# Patient Record
Sex: MALE | Race: WHITE | URBAN - METROPOLITAN AREA
[De-identification: names, ages, dates, MRNs, and addresses within clinical notes are randomized per-mention and may not be internally consistent; named-entity substitution may affect disease eponyms.]

---

## 2019-07-15 ENCOUNTER — TELEPHONE (OUTPATIENT)
Dept: CARDIOLOGY CLINIC | Age: 84
End: 2019-07-15

## 2019-07-15 NOTE — TELEPHONE ENCOUNTER
LMOM to contact office if still interested in scheduling appointment with Dr. Zafar Mcdowell in [place] office.

## 2021-10-13 PROBLEM — D64.9 ANEMIA: Status: ACTIVE | Noted: 2020-03-04

## 2021-10-13 PROBLEM — N30.01 ACUTE HEMORRHAGIC CYSTITIS: Status: ACTIVE | Noted: 2021-07-17

## 2021-10-13 PROBLEM — N17.9 AKI (ACUTE KIDNEY INJURY) (HCC): Status: ACTIVE | Noted: 2020-02-19

## 2021-10-13 PROBLEM — I10 ACCELERATED HYPERTENSION: Status: ACTIVE | Noted: 2020-05-07

## 2021-10-13 PROBLEM — I71.21 ASCENDING AORTIC ANEURYSM: Status: ACTIVE | Noted: 2020-06-05

## 2022-03-18 PROBLEM — I71.21 ASCENDING AORTIC ANEURYSM (HCC): Status: ACTIVE | Noted: 2020-06-05

## 2022-03-19 PROBLEM — N30.01 ACUTE HEMORRHAGIC CYSTITIS: Status: ACTIVE | Noted: 2021-07-17

## 2022-03-19 PROBLEM — D64.9 ANEMIA: Status: ACTIVE | Noted: 2020-03-04

## 2022-03-20 PROBLEM — I10 ACCELERATED HYPERTENSION: Status: ACTIVE | Noted: 2020-05-07

## 2022-03-20 PROBLEM — N17.9 AKI (ACUTE KIDNEY INJURY) (HCC): Status: ACTIVE | Noted: 2020-02-19

## 2023-07-14 ENCOUNTER — NURSE ONLY (OUTPATIENT)
Dept: LAB | Age: 88
End: 2023-07-14

## 2023-07-14 LAB
ALBUMIN SERPL BCG-MCNC: 3.3 G/DL (ref 3.5–5.1)
ALP SERPL-CCNC: 58 U/L (ref 38–126)
ALT SERPL W/O P-5'-P-CCNC: 13 U/L (ref 11–66)
ANION GAP SERPL CALC-SCNC: 11 MEQ/L (ref 8–16)
AST SERPL-CCNC: 17 U/L (ref 5–40)
BASOPHILS ABSOLUTE: 0 THOU/MM3 (ref 0–0.1)
BASOPHILS NFR BLD AUTO: 0.6 %
BILIRUB SERPL-MCNC: 0.3 MG/DL (ref 0.3–1.2)
BUN SERPL-MCNC: 44 MG/DL (ref 7–22)
CALCIUM SERPL-MCNC: 9.1 MG/DL (ref 8.5–10.5)
CHLORIDE SERPL-SCNC: 107 MEQ/L (ref 98–111)
CHOLEST SERPL-MCNC: 105 MG/DL (ref 100–199)
CO2 SERPL-SCNC: 26 MEQ/L (ref 23–33)
CREAT SERPL-MCNC: 2 MG/DL (ref 0.4–1.2)
DEPRECATED RDW RBC AUTO: 57.7 FL (ref 35–45)
EOSINOPHIL NFR BLD AUTO: 4.8 %
EOSINOPHILS ABSOLUTE: 0.3 THOU/MM3 (ref 0–0.4)
ERYTHROCYTE [DISTWIDTH] IN BLOOD BY AUTOMATED COUNT: 16.5 % (ref 11.5–14.5)
FERRITIN SERPL IA-MCNC: 210 NG/ML (ref 22–322)
GFR SERPL CREATININE-BSD FRML MDRD: 32 ML/MIN/1.73M2
GLUCOSE SERPL-MCNC: 84 MG/DL (ref 70–108)
HCT VFR BLD AUTO: 29.7 % (ref 42–52)
HDLC SERPL-MCNC: 45 MG/DL
HGB BLD-MCNC: 9.1 GM/DL (ref 14–18)
IMM GRANULOCYTES # BLD AUTO: 0.02 THOU/MM3 (ref 0–0.07)
IMM GRANULOCYTES NFR BLD AUTO: 0.3 %
LDLC SERPL CALC-MCNC: 52 MG/DL
LYMPHOCYTES ABSOLUTE: 1.1 THOU/MM3 (ref 1–4.8)
LYMPHOCYTES NFR BLD AUTO: 17.5 %
MAGNESIUM SERPL-MCNC: 2.1 MG/DL (ref 1.6–2.4)
MCH RBC QN AUTO: 29.4 PG (ref 26–33)
MCHC RBC AUTO-ENTMCNC: 30.6 GM/DL (ref 32.2–35.5)
MCV RBC AUTO: 96.1 FL (ref 80–94)
MONOCYTES ABSOLUTE: 0.6 THOU/MM3 (ref 0.4–1.3)
MONOCYTES NFR BLD AUTO: 9.1 %
NEUTROPHILS NFR BLD AUTO: 67.7 %
NRBC BLD AUTO-RTO: 0 /100 WBC
PLATELET # BLD AUTO: 276 THOU/MM3 (ref 130–400)
PMV BLD AUTO: 10.9 FL (ref 9.4–12.4)
POTASSIUM SERPL-SCNC: 4.3 MEQ/L (ref 3.5–5.2)
PROT SERPL-MCNC: 6.2 G/DL (ref 6.1–8)
RBC # BLD AUTO: 3.09 MILL/MM3 (ref 4.7–6.1)
SEGMENTED NEUTROPHILS ABSOLUTE COUNT: 4.4 THOU/MM3 (ref 1.8–7.7)
SODIUM SERPL-SCNC: 144 MEQ/L (ref 135–145)
T4 FREE SERPL-MCNC: 1.9 NG/DL (ref 0.93–1.76)
TRIGL SERPL-MCNC: 39 MG/DL (ref 0–199)
TSH SERPL DL<=0.005 MIU/L-ACNC: 0.34 UIU/ML (ref 0.4–4.2)
VIT B12 SERPL-MCNC: 769 PG/ML (ref 211–911)
WBC # BLD AUTO: 6.5 THOU/MM3 (ref 4.8–10.8)

## 2023-07-17 ENCOUNTER — OFFICE VISIT (OUTPATIENT)
Dept: NEPHROLOGY | Age: 88
End: 2023-07-17
Payer: MEDICARE

## 2023-07-17 VITALS
BODY MASS INDEX: 21.61 KG/M2 | WEIGHT: 138 LBS | DIASTOLIC BLOOD PRESSURE: 66 MMHG | SYSTOLIC BLOOD PRESSURE: 122 MMHG | OXYGEN SATURATION: 98 % | HEART RATE: 81 BPM

## 2023-07-17 DIAGNOSIS — D63.1 ANEMIA IN STAGE 3B CHRONIC KIDNEY DISEASE (HCC): ICD-10-CM

## 2023-07-17 DIAGNOSIS — N18.32 ANEMIA IN STAGE 3B CHRONIC KIDNEY DISEASE (HCC): ICD-10-CM

## 2023-07-17 DIAGNOSIS — I12.9 HYPERTENSIVE RENAL DISEASE, STAGE 1 THROUGH STAGE 4 OR UNSPECIFIED CHRONIC KIDNEY DISEASE: ICD-10-CM

## 2023-07-17 DIAGNOSIS — N18.32 CHRONIC KIDNEY DISEASE, STAGE 3B (HCC): Primary | ICD-10-CM

## 2023-07-17 PROCEDURE — G8420 CALC BMI NORM PARAMETERS: HCPCS | Performed by: INTERNAL MEDICINE

## 2023-07-17 PROCEDURE — G8427 DOCREV CUR MEDS BY ELIG CLIN: HCPCS | Performed by: INTERNAL MEDICINE

## 2023-07-17 PROCEDURE — 1036F TOBACCO NON-USER: CPT | Performed by: INTERNAL MEDICINE

## 2023-07-17 PROCEDURE — 1123F ACP DISCUSS/DSCN MKR DOCD: CPT | Performed by: INTERNAL MEDICINE

## 2023-07-17 PROCEDURE — 99204 OFFICE O/P NEW MOD 45 MIN: CPT | Performed by: INTERNAL MEDICINE

## 2023-07-17 RX ORDER — MONTELUKAST SODIUM 10 MG/1
10 TABLET ORAL DAILY
COMMUNITY
Start: 2023-04-27

## 2023-07-17 RX ORDER — CLONIDINE 0.3 MG/24H
PATCH, EXTENDED RELEASE TRANSDERMAL
COMMUNITY
Start: 2023-06-15

## 2023-07-17 RX ORDER — MIRTAZAPINE 7.5 MG/1
7.5 TABLET, FILM COATED ORAL NIGHTLY
COMMUNITY
Start: 2023-06-15

## 2023-07-17 RX ORDER — ESCITALOPRAM OXALATE 10 MG/1
10 TABLET ORAL DAILY
COMMUNITY
Start: 2023-07-10

## 2023-07-17 RX ORDER — ALBUTEROL SULFATE 90 UG/1
AEROSOL, METERED RESPIRATORY (INHALATION)
COMMUNITY
Start: 2023-06-14

## 2023-07-17 NOTE — PROGRESS NOTES
[facility] KIDNEY AND HYPERTENSION  [address] [facility]  Dept: [phone]  Loc: [phone]  Outpatient Consult Note  7/17/2023 2:32 PM        Pt Name:    Whit Martinez  YOB: 1935  Primary Care Physician:  Prabhjot Hoffman     Chief Complaint:   Chief Complaint   Patient presents with    New Patient     Dr. Cici Quach abnormal labs        Background Information/HPI   The patient is a 81 y.o. white malepatient who was sent in for evaluation of elevated creatinine. Pt reports hx CKD. Was previously seeing a nephrologist in [place]. Says he moved here about in Feb 2023. Reports hx HTN. Reports hx valve replacement at [place]. Denies any hx stents. No hx PPM or AICD. Ex-smoking. Reports hx colon cancer s/p colon resection long time ago. No hx kidney stones. No hx leg swelling. No urinary complaints such as dysuria or hematuria. Has mild ankle edema. No previous NSAId use. Previous  work. Reports long standing hx HTN. Says BP was running high in the past. He is on hydralazine and clonidine patch. Also takes lasix. Says Bp was 388 systolic at PCP office.  Reports BP has been an issues at times in the past.       Allergies:  Morphine and Iodinated contrast media        Past Medical History:  Past Medical History:   Diagnosis Date    Acute hemorrhagic cystitis     JACKI (acute kidney injury) ([address])     Aortic stenosis     Cataracts, bilateral     Chronic diastolic heart failure (HCC)     CKD (chronic kidney disease)     Esophageal reflux     High cholesterol     History of gross hematuria     History of tobacco use     History of urinary retention     Hypertension     Hypertrophy of prostate without urinary obstruction and other lower urinary tract symptoms (LUTS)     Hypothyroid     Incomplete bladder emptying     Nocturia     4-5x a night    PAF (paroxysmal atrial fibrillation) (HCC)     PVD (peripheral vascular

## 2023-07-19 DIAGNOSIS — N18.32 CHRONIC KIDNEY DISEASE, STAGE 3B (HCC): ICD-10-CM

## 2023-07-19 DIAGNOSIS — I12.9 HYPERTENSIVE RENAL DISEASE, STAGE 1 THROUGH STAGE 4 OR UNSPECIFIED CHRONIC KIDNEY DISEASE: ICD-10-CM

## 2023-08-21 ENCOUNTER — NURSE ONLY (OUTPATIENT)
Age: 88
End: 2023-08-21

## 2023-08-21 LAB
T4 FREE SERPL-MCNC: 1.63 NG/DL (ref 0.93–1.76)
TSH SERPL DL<=0.005 MIU/L-ACNC: 0.17 UIU/ML (ref 0.4–4.2)

## 2023-09-12 LAB
BILIRUBIN URINE: NEGATIVE
BLOOD, URINE: NEGATIVE
BUN BLDV-MCNC: 55 MG/DL (ref 7–22)
CALCIUM SERPL-MCNC: 9 MG/DL (ref 8.4–10.2)
CHLORIDE BLD-SCNC: 106 MEQ/L (ref 98–107)
CLARITY: CLEAR
CO2: 29 MEQ/L (ref 22–31)
COLOR, URINE: YELLOW
CREAT SERPL-MCNC: 2.8 MG/DL (ref 0.4–1.1)
CREATININE URINE: 102.9 MG/DL
GFR SERPL CREATININE-BSD FRML MDRD: 22 ML/MIN/{1.73_M2}
GLUCOSE URINE: NEGATIVE MG/DL
GLUCOSE: 86 MG/DL (ref 70–126)
HCT VFR BLD CALC: 29 % (ref 42–52)
HEMOGLOBIN: 9.7 G/DL (ref 14–18)
KETONES, URINE: NEGATIVE MG/DL
LEUKOCYTES, UA: NEGATIVE
MICROSCOPIC URINE: NO
NITRITE, URINE: NEGATIVE
PH, URINE: 5 (ref 5–8)
PHOSPHORUS: 3.9 MG/DL (ref 2.5–4.6)
POTASSIUM SERPL-SCNC: 3.5 MEQ/L (ref 3.5–5.1)
PROTEIN, URINE: 16 MG/DL (ref 0–10)
PROTEIN, URINE: NEGATIVE MG/DL
SODIUM BLD-SCNC: 142 MEQ/L (ref 136–145)
SPECIFIC GRAVITY UA: 1.01 (ref 1.01–1.02)
UROBILINOGEN, URINE: 0.2 E.U./DL (ref 0.2–1)
VITAMIN D 25-HYDROXY: 40 NG/ML (ref 30–100)

## 2023-09-13 LAB — PARATHYROID HORMONE INTACT: 59.7 PG/ML (ref 15–65)

## 2023-09-18 ENCOUNTER — OFFICE VISIT (OUTPATIENT)
Dept: NEPHROLOGY | Age: 88
End: 2023-09-18
Payer: MEDICARE

## 2023-09-18 VITALS
DIASTOLIC BLOOD PRESSURE: 67 MMHG | WEIGHT: 139 LBS | HEART RATE: 82 BPM | OXYGEN SATURATION: 98 % | SYSTOLIC BLOOD PRESSURE: 118 MMHG | BODY MASS INDEX: 21.77 KG/M2

## 2023-09-18 DIAGNOSIS — I12.9 HYPERTENSIVE RENAL DISEASE, STAGE 1 THROUGH STAGE 4 OR UNSPECIFIED CHRONIC KIDNEY DISEASE: ICD-10-CM

## 2023-09-18 DIAGNOSIS — D63.1 ANEMIA IN STAGE 4 CHRONIC KIDNEY DISEASE (HCC): ICD-10-CM

## 2023-09-18 DIAGNOSIS — N18.4 CKD (CHRONIC KIDNEY DISEASE), STAGE IV (HCC): Primary | ICD-10-CM

## 2023-09-18 DIAGNOSIS — N18.4 ANEMIA IN STAGE 4 CHRONIC KIDNEY DISEASE (HCC): ICD-10-CM

## 2023-09-18 PROCEDURE — 1036F TOBACCO NON-USER: CPT | Performed by: INTERNAL MEDICINE

## 2023-09-18 PROCEDURE — 1123F ACP DISCUSS/DSCN MKR DOCD: CPT | Performed by: INTERNAL MEDICINE

## 2023-09-18 PROCEDURE — G8420 CALC BMI NORM PARAMETERS: HCPCS | Performed by: INTERNAL MEDICINE

## 2023-09-18 PROCEDURE — G8427 DOCREV CUR MEDS BY ELIG CLIN: HCPCS | Performed by: INTERNAL MEDICINE

## 2023-09-18 PROCEDURE — 99213 OFFICE O/P EST LOW 20 MIN: CPT | Performed by: INTERNAL MEDICINE

## 2023-09-18 RX ORDER — ISOSORBIDE MONONITRATE 60 MG/1
60 TABLET, EXTENDED RELEASE ORAL DAILY
COMMUNITY
Start: 2023-09-04

## 2023-09-18 NOTE — PROGRESS NOTES
[facility] KIDNEY AND HYPERTENSION  [address] [address]  Dept: [phone]  Loc: [phone]  Office Progress Note  9/18/2023 2:53 PM      Pt Name:    Morales Barton  YOB: 1935  Primary Care Physician:  Cameron Grider     Chief Complaint:   Chief Complaint   Patient presents with    Chronic Kidney Disease     Stage 4        Background Information/Interval History:   The patient is a 81 y.o. white malepatient who was sent in for evaluation of elevated creatinine. Pt reports hx CKD. Was previously seeing a nephrologist in [place]. Says he moved here about in Feb 2023. Reports hx HTN. Reports hx valve replacement at [place]. Ex-smoking. Reports hx colon cancer s/p colon resection long time ago. Reports long standing hx HTN. Says BP was running high in the past. He is on hydralazine and clonidine patch. Also takes lasix. He is here for 2 months follow-up. Here with his daughter in law. Feels okay. No urinary complaints. BP is stable in office today. Pt does not remember the name of his nephrologist that he was seeing in [place].       Past History:  Past Medical History:   Diagnosis Date    Acute hemorrhagic cystitis     JACKI (acute kidney injury) ([address])     Aortic stenosis     Cataracts, bilateral     Chronic diastolic heart failure (HCC)     CKD (chronic kidney disease)     Esophageal reflux     High cholesterol     History of gross hematuria     History of tobacco use     History of urinary retention     Hypertension     Hypertrophy of prostate without urinary obstruction and other lower urinary tract symptoms (LUTS)     Hypothyroid     Incomplete bladder emptying     Nocturia     4-5x a night    PAF (paroxysmal atrial fibrillation) (HCC)     PVD (peripheral vascular disease) (HCC)     Reflux     Sleep apnea     Urinary tract infection, site not specified      Past Surgical History:   Procedure Laterality Date

## 2023-10-13 ENCOUNTER — NURSE ONLY (OUTPATIENT)
Age: 88
End: 2023-10-13

## 2023-10-13 LAB
ALBUMIN SERPL BCG-MCNC: 3.9 G/DL (ref 3.5–5.1)
ALP SERPL-CCNC: 80 U/L (ref 38–126)
ALT SERPL W/O P-5'-P-CCNC: 13 U/L (ref 11–66)
ANION GAP SERPL CALC-SCNC: 11 MEQ/L (ref 8–16)
AST SERPL-CCNC: 19 U/L (ref 5–40)
BASOPHILS ABSOLUTE: 0.1 THOU/MM3 (ref 0–0.1)
BASOPHILS NFR BLD AUTO: 0.9 %
BILIRUB SERPL-MCNC: 0.3 MG/DL (ref 0.3–1.2)
BUN SERPL-MCNC: 42 MG/DL (ref 7–22)
CALCIUM SERPL-MCNC: 9.4 MG/DL (ref 8.5–10.5)
CHLORIDE SERPL-SCNC: 105 MEQ/L (ref 98–111)
CHOLEST SERPL-MCNC: 105 MG/DL (ref 100–199)
CO2 SERPL-SCNC: 29 MEQ/L (ref 23–33)
CREAT SERPL-MCNC: 2.7 MG/DL (ref 0.4–1.2)
DEPRECATED RDW RBC AUTO: 53.2 FL (ref 35–45)
EOSINOPHIL NFR BLD AUTO: 3.7 %
EOSINOPHILS ABSOLUTE: 0.3 THOU/MM3 (ref 0–0.4)
ERYTHROCYTE [DISTWIDTH] IN BLOOD BY AUTOMATED COUNT: 15.3 % (ref 11.5–14.5)
FERRITIN SERPL IA-MCNC: 225 NG/ML (ref 22–322)
GFR SERPL CREATININE-BSD FRML MDRD: 22 ML/MIN/1.73M2
GLUCOSE SERPL-MCNC: 71 MG/DL (ref 70–108)
HCT VFR BLD AUTO: 32.2 % (ref 42–52)
HDLC SERPL-MCNC: 43 MG/DL
HGB BLD-MCNC: 9.8 GM/DL (ref 14–18)
IMM GRANULOCYTES # BLD AUTO: 0.02 THOU/MM3 (ref 0–0.07)
IMM GRANULOCYTES NFR BLD AUTO: 0.3 %
LDLC SERPL CALC-MCNC: 54 MG/DL
LYMPHOCYTES ABSOLUTE: 1.1 THOU/MM3 (ref 1–4.8)
LYMPHOCYTES NFR BLD AUTO: 15.9 %
MAGNESIUM SERPL-MCNC: 2.4 MG/DL (ref 1.6–2.4)
MCH RBC QN AUTO: 29.4 PG (ref 26–33)
MCHC RBC AUTO-ENTMCNC: 30.4 GM/DL (ref 32.2–35.5)
MCV RBC AUTO: 96.7 FL (ref 80–94)
MONOCYTES ABSOLUTE: 0.7 THOU/MM3 (ref 0.4–1.3)
MONOCYTES NFR BLD AUTO: 9.9 %
NEUTROPHILS NFR BLD AUTO: 69.3 %
NRBC BLD AUTO-RTO: 0 /100 WBC
PLATELET # BLD AUTO: 279 THOU/MM3 (ref 130–400)
PMV BLD AUTO: 10.7 FL (ref 9.4–12.4)
POTASSIUM SERPL-SCNC: 4.6 MEQ/L (ref 3.5–5.2)
PROT SERPL-MCNC: 6.6 G/DL (ref 6.1–8)
PSA SERPL-MCNC: 0.32 NG/ML (ref 0–1)
RBC # BLD AUTO: 3.33 MILL/MM3 (ref 4.7–6.1)
SEGMENTED NEUTROPHILS ABSOLUTE COUNT: 4.9 THOU/MM3 (ref 1.8–7.7)
SODIUM SERPL-SCNC: 145 MEQ/L (ref 135–145)
T4 FREE SERPL-MCNC: 2.05 NG/DL (ref 0.93–1.76)
TRIGL SERPL-MCNC: 39 MG/DL (ref 0–199)
TSH SERPL DL<=0.005 MIU/L-ACNC: 0.75 UIU/ML (ref 0.4–4.2)
VIT B12 SERPL-MCNC: 676 PG/ML (ref 211–911)
WBC # BLD AUTO: 7 THOU/MM3 (ref 4.8–10.8)

## 2023-10-18 ENCOUNTER — NURSE ONLY (OUTPATIENT)
Age: 88
End: 2023-10-18

## 2023-10-18 DIAGNOSIS — D63.1 ANEMIA IN STAGE 4 CHRONIC KIDNEY DISEASE (HCC): ICD-10-CM

## 2023-10-18 DIAGNOSIS — N18.4 ANEMIA IN STAGE 4 CHRONIC KIDNEY DISEASE (HCC): ICD-10-CM

## 2023-10-18 DIAGNOSIS — N18.4 CKD (CHRONIC KIDNEY DISEASE), STAGE IV (HCC): ICD-10-CM

## 2023-10-18 DIAGNOSIS — I12.9 HYPERTENSIVE RENAL DISEASE, STAGE 1 THROUGH STAGE 4 OR UNSPECIFIED CHRONIC KIDNEY DISEASE: ICD-10-CM

## 2023-10-18 LAB
HCT VFR BLD AUTO: 32.7 % (ref 42–52)
HGB BLD-MCNC: 9.9 GM/DL (ref 14–18)

## 2023-10-19 LAB
ANION GAP SERPL CALC-SCNC: 11 MEQ/L (ref 8–16)
BUN SERPL-MCNC: 49 MG/DL (ref 7–22)
CALCIUM SERPL-MCNC: 9 MG/DL (ref 8.5–10.5)
CHLORIDE SERPL-SCNC: 105 MEQ/L (ref 98–111)
CO2 SERPL-SCNC: 28 MEQ/L (ref 23–33)
CREAT SERPL-MCNC: 2.4 MG/DL (ref 0.4–1.2)
FERRITIN SERPL IA-MCNC: 199 NG/ML (ref 22–322)
FOLATE SERPL-MCNC: 15.2 NG/ML (ref 4.8–24.2)
GFR SERPL CREATININE-BSD FRML MDRD: 25 ML/MIN/1.73M2
GLUCOSE SERPL-MCNC: 137 MG/DL (ref 70–108)
IRON SATN MFR SERPL: 31 % (ref 20–50)
IRON SERPL-MCNC: 60 UG/DL (ref 65–195)
POTASSIUM SERPL-SCNC: 4.4 MEQ/L (ref 3.5–5.2)
SODIUM SERPL-SCNC: 144 MEQ/L (ref 135–145)
TIBC SERPL-MCNC: 191 UG/DL (ref 171–450)
VIT B12 SERPL-MCNC: 594 PG/ML (ref 211–911)

## 2023-12-04 ENCOUNTER — NURSE ONLY (OUTPATIENT)
Age: 88
End: 2023-12-04

## 2023-12-04 LAB
T4 FREE SERPL-MCNC: 1.81 NG/DL (ref 0.93–1.76)
TSH SERPL DL<=0.005 MIU/L-ACNC: 0.34 UIU/ML (ref 0.4–4.2)

## 2023-12-07 ENCOUNTER — OFFICE VISIT (OUTPATIENT)
Dept: NEPHROLOGY | Age: 88
End: 2023-12-07
Payer: MEDICARE

## 2023-12-07 VITALS
HEART RATE: 73 BPM | OXYGEN SATURATION: 97 % | WEIGHT: 141 LBS | HEIGHT: 67 IN | SYSTOLIC BLOOD PRESSURE: 118 MMHG | BODY MASS INDEX: 22.13 KG/M2 | DIASTOLIC BLOOD PRESSURE: 59 MMHG

## 2023-12-07 DIAGNOSIS — R35.0 FREQUENCY OF MICTURITION: ICD-10-CM

## 2023-12-07 DIAGNOSIS — N28.1 RENAL CYST: ICD-10-CM

## 2023-12-07 DIAGNOSIS — N18.4 CKD (CHRONIC KIDNEY DISEASE), STAGE IV (HCC): Primary | ICD-10-CM

## 2023-12-07 DIAGNOSIS — R33.9 INCOMPLETE BLADDER EMPTYING: ICD-10-CM

## 2023-12-07 DIAGNOSIS — I12.9 HYPERTENSIVE RENAL DISEASE, STAGE 1 THROUGH STAGE 4 OR UNSPECIFIED CHRONIC KIDNEY DISEASE: ICD-10-CM

## 2023-12-07 PROCEDURE — G8427 DOCREV CUR MEDS BY ELIG CLIN: HCPCS | Performed by: INTERNAL MEDICINE

## 2023-12-07 PROCEDURE — G8484 FLU IMMUNIZE NO ADMIN: HCPCS | Performed by: INTERNAL MEDICINE

## 2023-12-07 PROCEDURE — G8420 CALC BMI NORM PARAMETERS: HCPCS | Performed by: INTERNAL MEDICINE

## 2023-12-07 PROCEDURE — 1036F TOBACCO NON-USER: CPT | Performed by: INTERNAL MEDICINE

## 2023-12-07 PROCEDURE — 1123F ACP DISCUSS/DSCN MKR DOCD: CPT | Performed by: INTERNAL MEDICINE

## 2023-12-07 PROCEDURE — 99213 OFFICE O/P EST LOW 20 MIN: CPT | Performed by: INTERNAL MEDICINE

## 2023-12-07 NOTE — PROGRESS NOTES
[facility] KIDNEY AND HYPERTENSION  [address] [address]  Dept: [phone]  Loc: [phone]  Office Progress Note  12/7/2023 1:57 PM      Pt Name:    Юлия Wyatt  YOB: 1944  Primary Care Physician:  Román Russell     Chief Complaint:   Chief Complaint   Patient presents with    Chronic Kidney Disease     Stage 4        Background Information/Interval History:   The patient is a 81 y.o. white malepatient who was sent in for evaluation of elevated creatinine. Pt reports hx CKD. Was previously seeing a nephrologist in [place]. Says he moved here about in Feb 2023. Reports hx HTN. Reports hx valve replacement at [place]. Ex-smoking. Reports hx colon cancer s/p colon resection long time ago. Reports long standing hx HTN. Says BP was running high in the past. He is on hydralazine and clonidine patch. Also takes lasix. Here with his daughter in law. No hematuria. Reports incomplete bladder emptying, frequent dribbling. No leg swelling.       Past History:  Past Medical History:   Diagnosis Date    Acute hemorrhagic cystitis     JACKI (acute kidney injury) ([address])     Aortic stenosis     Cataracts, bilateral     Chronic diastolic heart failure (HCC)     CKD (chronic kidney disease)     Esophageal reflux     High cholesterol     History of gross hematuria     History of tobacco use     History of urinary retention     Hypertension     Hypertrophy of prostate without urinary obstruction and other lower urinary tract symptoms (LUTS)     Hypothyroid     Incomplete bladder emptying     Nocturia     4-5x a night    PAF (paroxysmal atrial fibrillation) (HCC)     PVD (peripheral vascular disease) (HCC)     Reflux     Sleep apnea     Urinary tract infection, site not specified      Past Surgical History:   Procedure Laterality Date    AORTIC VALVE REPLACEMENT      CARDIAC CATHETERIZATION      CATARACT REMOVAL

## 2024-01-22 ENCOUNTER — NURSE ONLY (OUTPATIENT)
Age: 89
End: 2024-01-22

## 2024-01-22 LAB
T4 FREE SERPL-MCNC: 1.67 NG/DL (ref 0.93–1.76)
TSH SERPL DL<=0.005 MIU/L-ACNC: 1.9 UIU/ML (ref 0.4–4.2)

## 2024-01-23 ENCOUNTER — HOSPITAL ENCOUNTER (OUTPATIENT)
Age: 89
Discharge: HOME | End: 2024-01-23
Payer: MEDICARE

## 2024-01-23 ENCOUNTER — TELEPHONE (OUTPATIENT)
Dept: UROLOGY | Age: 89
End: 2024-01-23

## 2024-01-23 ENCOUNTER — PROCEDURE VISIT (OUTPATIENT)
Dept: UROLOGY | Age: 89
End: 2024-01-23
Payer: MEDICARE

## 2024-01-23 VITALS — RESPIRATION RATE: 16 BRPM | WEIGHT: 141 LBS | BODY MASS INDEX: 22.13 KG/M2 | HEIGHT: 67 IN

## 2024-01-23 DIAGNOSIS — N40.1 BENIGN LOCALIZED PROSTATIC HYPERPLASIA WITH LOWER URINARY TRACT SYMPTOMS (LUTS): ICD-10-CM

## 2024-01-23 DIAGNOSIS — N40.1 BENIGN LOCALIZED PROSTATIC HYPERPLASIA WITH LOWER URINARY TRACT SYMPTOMS (LUTS): Primary | ICD-10-CM

## 2024-01-23 DIAGNOSIS — Z01.818 PRE-OP TESTING: ICD-10-CM

## 2024-01-23 LAB
ANION GAP SERPL CALC-SCNC: 9 MEQ/L (ref 8–16)
BASOPHILS ABSOLUTE: 0 THOU/MM3 (ref 0–0.1)
BASOPHILS NFR BLD AUTO: 0.6 %
BUN SERPL-MCNC: 54 MG/DL (ref 7–22)
CALCIUM SERPL-MCNC: 9.3 MG/DL (ref 8.5–10.5)
CHLORIDE SERPL-SCNC: 102 MEQ/L (ref 98–111)
CO2 SERPL-SCNC: 31 MEQ/L (ref 23–33)
CREAT SERPL-MCNC: 2.6 MG/DL (ref 0.4–1.2)
DEPRECATED RDW RBC AUTO: 51.6 FL (ref 35–45)
EOSINOPHIL NFR BLD AUTO: 3 %
EOSINOPHILS ABSOLUTE: 0.2 THOU/MM3 (ref 0–0.4)
ERYTHROCYTE [DISTWIDTH] IN BLOOD BY AUTOMATED COUNT: 14.3 % (ref 11.5–14.5)
GFR SERPL CREATININE-BSD FRML MDRD: 23 ML/MIN/1.73M2
GLUCOSE SERPL-MCNC: 92 MG/DL (ref 70–108)
HCT VFR BLD AUTO: 31.4 % (ref 42–52)
HGB BLD-MCNC: 9.9 GM/DL (ref 14–18)
IMM GRANULOCYTES # BLD AUTO: 0.02 THOU/MM3 (ref 0–0.07)
IMM GRANULOCYTES NFR BLD AUTO: 0.3 %
LYMPHOCYTES ABSOLUTE: 1.1 THOU/MM3 (ref 1–4.8)
LYMPHOCYTES NFR BLD AUTO: 16.7 %
MCH RBC QN AUTO: 30.7 PG (ref 26–33)
MCHC RBC AUTO-ENTMCNC: 31.5 GM/DL (ref 32.2–35.5)
MCV RBC AUTO: 97.5 FL (ref 80–94)
MONOCYTES ABSOLUTE: 0.6 THOU/MM3 (ref 0.4–1.3)
MONOCYTES NFR BLD AUTO: 9.3 %
NEUTROPHILS NFR BLD AUTO: 70.1 %
NRBC BLD AUTO-RTO: 0 /100 WBC
PLATELET # BLD AUTO: 229 THOU/MM3 (ref 130–400)
PMV BLD AUTO: 10.5 FL (ref 9.4–12.4)
POTASSIUM SERPL-SCNC: 3.8 MEQ/L (ref 3.5–5.2)
RBC # BLD AUTO: 3.22 MILL/MM3 (ref 4.7–6.1)
SEGMENTED NEUTROPHILS ABSOLUTE COUNT: 4.6 THOU/MM3 (ref 1.8–7.7)
SODIUM SERPL-SCNC: 142 MEQ/L (ref 135–145)
WBC # BLD AUTO: 6.6 THOU/MM3 (ref 4.8–10.8)

## 2024-01-23 PROCEDURE — 85025 COMPLETE CBC W/AUTO DIFF WBC: CPT

## 2024-01-23 PROCEDURE — 36415 COLL VENOUS BLD VENIPUNCTURE: CPT

## 2024-01-23 PROCEDURE — 52000 CYSTOURETHROSCOPY: CPT | Performed by: UROLOGY

## 2024-01-23 PROCEDURE — 93005 ELECTROCARDIOGRAM TRACING: CPT

## 2024-01-23 PROCEDURE — 87077 CULTURE AEROBIC IDENTIFY: CPT

## 2024-01-23 PROCEDURE — 87186 SC STD MICRODIL/AGAR DIL: CPT

## 2024-01-23 PROCEDURE — 80048 BASIC METABOLIC PNL TOTAL CA: CPT

## 2024-01-23 PROCEDURE — 87086 URINE CULTURE/COLONY COUNT: CPT

## 2024-01-23 NOTE — TELEPHONE ENCOUNTER
DO NOT TAKE  FISH OIL, MOBIC, IBUPROFEN, MOTRIN-LIKE DRUGS AND ANY MULTIVITAMINS OR OVER THE COUNTER SUPPLEMENTS 14 DAYS PRIOR TO SURGERY.    HOLD ASPIRIN 5 DAYS PRIOR TO SURGERY  HOLD LASIX THE MORNING OF SURGERY    MUST HAVE AN ADULT OVER THE AGE OF 18 WITH YOU AT THE TIME OF THE DISCHARGE AND WITH YOU AT HOME AFTER THE PROCEDURE FOR 24 HOURS        Samuel Gupta 1935     Surgical Physician: Dr. Hester      You have been scheduled for the procedure marked below:      Surgery: Cystoscopy, Greenlight Photo Vaporization of Prostate         Date: 2/6/2024     Anesthesia: Anesthesiologist (General/Spinal)     Place of Service: [facility] Second Floor Same Day Surgery         Arrive to same day surgery at:  9:00am  (Surgery time is subject to change)      INSTRUCTIONS AS MARKED BELOW:    1.  DO NOT eat or drink anything after midnight before surgery.  2.  We prefer you shower or bathe with an antibacterial soap (Dial) the morning of surgery.  3  Please bring a current medication list, photo ID and insurance card(s) with you  4. Okay to take Tylenol  5. Take blood pressure or heart medication as directed, if taken in the morning take with a small sip of water  6.The office will call you in 1-2 days after your procedure to schedule a follow up.            Date: 1/23/2024

## 2024-01-23 NOTE — TELEPHONE ENCOUNTER
CARDIAC CLEARANCE FORM    IF THE PATIENT NEEDS A CLEARANCE APPOINTMENT WITH YOUR OFFICE PLEASE CONTACT THE PATIENT    Clearance From  Dr. Barragan     Appointment Date    Time       Samuel Gupta  1935  Surgeon:  Dr. Jimbo Hester    Procedure:  Cystoscopy, Greenlight Photo Vaporization of Prostate        Date:  2/6/2024  Facility: [facility]    I.  MEDICAL HISTORY  DM CAD PVD CVA DVT/PE MI CHFMalignant Hyperthemia HTN Tobacco/ETOH Sleep Apnea GERD Hyperlipidemia Renal Insufficiency COPD/Asthma Bleeding Disorder Pacemaker/AICD  II. CURRENT MEDICATIONS: Attach list or complete     Pt is on following meds that need special instructions for surgery:  Anticoagulants Heart Meds ASA Insulin Oral anti-diabetics NSAIDS Diuretics     K replacements  III.  ALLERGIES:   IV.  FUNCTIONAL CAPACITY  >4 METS (CAN VACUUM/HOUSEWORK,CLIMB FLIGHT STAIRS WITHOUT DYSPNEA)  <4METS (FLIGHT OF STEPS CAUSES DYSPNEA/CARDIAC SYMPTOMS)   Stress Test Recommended:    Stress tests or Cardiac Cath in last 5 years:  Yes (attach report)  No   Results: WNL   ABN  Any Change in Cardiac symptoms: Yes  NO  Comments:    Revascularization in last 5 years: Yes  NO  CABG: Yes  No   Comments:     Stents:  Date: ________  Any change in cardiac symptoms  Yes  NO  Comments:   V.  REVIEW OF SYSTEMS:  (Pertinent positive or negative)    VI. PHYSICIAL EXAM  HEENT:1.  Dentations   Good Poor          HT:_______ WT:______      2.  Neck Pathology: Rheumatoid DDD C-spine  BP:______ P:________  PULMONARY:  CARDIAC  ABDOMEN  EXTREMITIES  OTHER  VII.  Testing Ordered by Surgeon  Reviewed by Clearance Physician  Test      Result  Plan,if Abnormal  ___CBS     WNL  ABN____________________  ___BMP/BUN/CR    WNL  ABN____________________  ___K+      WNL  ABN____________________  ___UA      WNL  ABN____________________  ___CXR     WNL  ABN____________________  ___EKG     WNL  ABN____________________  ___MRSA     WNL  ABN____________________  VIII.  ___Acceptable risk for

## 2024-01-23 NOTE — TELEPHONE ENCOUNTER
SURGERY SCHEDULING FORM   [address] [address]      Phone *[phone] *[phone]   Surgical Scheduling Direct Line Phone *[phone] Fax *[phone]      Samuel Gupta 1935 male    [address] [place] [address] [place] [address]  Marital Status:          Home Phone: [phone]      Cell Phone:    Telephone Information:   Mobile [phone]          Surgeon: Dr. Hester Surgery Date: 2/6/2024   Time: 11:00am    Procedure: Cystoscopy, Greenlight Photo Vaporization of Prostate    Diagnosis: BPH     Important Medical History:  In Epic    Special Inst/Equip: UMER Choi conf#[id number]    CPT Codes:    78[address]  Latex Allergy: No     Cardiac Device:  No    Anesthesia:  General          Admission Type:  Same Day                        Admit Prior to Day of Surgery: No    Case Location:  Main OR            Preadmission Testing:  Phone Call          PAT Date and Time:______________________________________________________    PAT Confirmation #: ______________________________________________________    Post Op Visit: ___________________________________________________________    Need Preop Cardiac Clearance: Yes    Does Patient have Cardiologist/physician?     Dr. Barragan    Surgery Confirmation #: __________________________________________________    : ________________________   Date: __________________________     Insurance Company Name: [employer]

## 2024-01-23 NOTE — TELEPHONE ENCOUNTER
Patient is scheduled for surgery with  on 2/6/2024. Surgery consent to be done on arrival. Dr. EPSTEIN to clear. Patient to do pre op URINE CULTURE, LABS AND EKG UPON LEAVING THE OFFICE TODAY, 1/23/2024. Surgery instructions gone over with patient verbally in the office   Patient informed an adult over the age of 18 must be with them at the time of surgery, upon discharge and at home for 24 hours after the procedure.    THI CONF#[id number] PER REMIGIO

## 2024-01-23 NOTE — PROGRESS NOTES
Cystoscopy    Operative Note    Patient:  Samuel Gupta  MRN: [id number]  YOB: 1935    Date: 01/23/24  Surgeon: CORDELL FREEMAN MD  Anesthesia: Urojet Local  Indications:     BPH- worsening. Hx of outlet surgery in last ten years. On avodart and flomax. Terrible bother. Needs cystoscopy for varma eval.         Position: Supine  EBL: 0 ml    Findings:   The patient was prepped and draped in the usual sterile fashion.  The flexible cystoscope was advanced through the urethra and into the bladder.  The bladder was thoroughly inspected and the following was noted:    Residual Urine: moderate.  Urine clear, with no obvious infection  Urethra: No abnormalities of the urethra are noted. Urethral dilation was not performed.    Prostate:  open bladder neck but regrowth of apical tissue , intravesical extension of prostate not present. There was a previous TURP defect.   Bladder: no tumor noted .   Moderate trabeculation noted.  no bladder diverticulum.  Ureters: Orifices with normal configuration and location.      The cystoscope was removed.  The patient tolerated the procedure well.  Cysto pvp (25)

## 2024-01-24 LAB
EKG ATRIAL RATE: 74 BPM
EKG P AXIS: 71 DEGREES
EKG P-R INTERVAL: 164 MS
EKG Q-T INTERVAL: 462 MS
EKG QRS DURATION: 158 MS
EKG QTC CALCULATION (BAZETT): 512 MS
EKG R AXIS: -64 DEGREES
EKG T AXIS: 72 DEGREES
EKG VENTRICULAR RATE: 74 BPM

## 2024-01-25 ENCOUNTER — PREP FOR PROCEDURE (OUTPATIENT)
Dept: UROLOGY | Age: 89
End: 2024-01-25

## 2024-01-25 LAB
BACTERIA UR CULT: ABNORMAL
ORGANISM: ABNORMAL

## 2024-01-26 RX ORDER — LEVOFLOXACIN 500 MG/1
500 TABLET, FILM COATED ORAL DAILY
Qty: 7 TABLET | Refills: 0 | Status: SHIPPED | OUTPATIENT
Start: 2024-01-26 | End: 2024-02-02

## 2024-01-27 RX ORDER — SODIUM CHLORIDE 0.9 % (FLUSH) 0.9 %
5-40 SYRINGE (ML) INJECTION EVERY 12 HOURS SCHEDULED
Status: CANCELLED | OUTPATIENT
Start: 2024-01-27

## 2024-01-27 RX ORDER — IPRATROPIUM BROMIDE AND ALBUTEROL SULFATE 2.5; .5 MG/3ML; MG/3ML
1 SOLUTION RESPIRATORY (INHALATION) EVERY 4 HOURS PRN
Status: CANCELLED | OUTPATIENT
Start: 2024-02-06

## 2024-01-27 RX ORDER — SODIUM CHLORIDE 0.9 % (FLUSH) 0.9 %
5-40 SYRINGE (ML) INJECTION PRN
Status: CANCELLED | OUTPATIENT
Start: 2024-01-27

## 2024-01-27 RX ORDER — SODIUM CHLORIDE 9 MG/ML
INJECTION, SOLUTION INTRAVENOUS PRN
Status: CANCELLED | OUTPATIENT
Start: 2024-01-27

## 2024-01-29 RX ORDER — FUROSEMIDE 40 MG/1
40 TABLET ORAL DAILY
COMMUNITY

## 2024-01-29 RX ORDER — LEVOTHYROXINE SODIUM 0.1 MG/1
100 TABLET ORAL DAILY
COMMUNITY

## 2024-01-29 NOTE — PROGRESS NOTES
In preparation for their surgical procedure above patient was screened for Obstructive Sleep Apnea (KATIA) using the STOP-Bang Questionnaire by the Pre-Admission Testing department.  This is a pre-surgical screening tool for patient safety and serves as a recommendation, this WILL NOT cause cancellation of surgery.    STOP-Bang Questionnaire  * Do you currently see a pulmonologist?  No     If yes STOP, do not complete.  Patient follows with Dr.     1.  Do you snore loudly (able to be heard in the next room)?      No    2.  Do you often feel tired or sleepy during the daytime?          No       3.  Has anyone ever told you that you stop breathing during your sleep?       No    4.  Do you have or are you being treated for high blood pressure?          Yes      5.  BMI more than 35?  BMI (Calculated): 22        No    6.  Age over 50 years? 88 y.o.      Yes    7.  Neck Circumference greater than 17 inches for male or 16 inches for female?  Measured           (visits only)            Not Applicable    8.  Gender Male?                 Yes      TOTAL SCORE: 3    KATIA - Low Risk : Yes to 0 - 2 questions  KATIA - Intermediate Risk : Yes to 3 - 4 questions  KATIA - High Risk : Yes to 5 - 8 questions    Adapted from:   STOP Questionnaire: A Tool to Screen Patients for Obstructive Sleep Apnea   CORAZON Jimenez.SUZIE.C.P.C., Mk Sharp M.B.B.S., Sabas Quintero M.D., Zoya Basurto, Ph.D., CECY Diaz.B.B.S., CECY Bailey.Sc., John Childers M.D., Joseph Meléndez F.R.C.P.C.   Anesthesiology 2008; 108:812-21 Copyright 2008, the American Society of Anesthesiologists, Inc. [employer]   ----------------------------------------------------------------------------------------------------------------

## 2024-02-06 ENCOUNTER — ANESTHESIA EVENT (OUTPATIENT)
Dept: OPERATING ROOM | Age: 89
End: 2024-02-06
Payer: MEDICARE

## 2024-02-06 ENCOUNTER — HOSPITAL ENCOUNTER (OUTPATIENT)
Age: 89
Setting detail: OUTPATIENT SURGERY
Discharge: HOME | End: 2024-02-06
Attending: UROLOGY | Admitting: UROLOGY
Payer: MEDICARE

## 2024-02-06 ENCOUNTER — ANESTHESIA (OUTPATIENT)
Dept: OPERATING ROOM | Age: 89
End: 2024-02-06
Payer: MEDICARE

## 2024-02-06 VITALS
WEIGHT: 141.6 LBS | TEMPERATURE: 97.6 F | DIASTOLIC BLOOD PRESSURE: 65 MMHG | SYSTOLIC BLOOD PRESSURE: 155 MMHG | RESPIRATION RATE: 16 BRPM | HEART RATE: 80 BPM | BODY MASS INDEX: 22.76 KG/M2 | OXYGEN SATURATION: 98 % | HEIGHT: 66 IN

## 2024-02-06 DIAGNOSIS — G89.18 POSTOPERATIVE PAIN: Primary | ICD-10-CM

## 2024-02-06 LAB — POTASSIUM SERPL-SCNC: 4.1 MEQ/L (ref 3.5–5.2)

## 2024-02-06 PROCEDURE — 3600000013 HC SURGERY LEVEL 3 ADDTL 15MIN: Performed by: UROLOGY

## 2024-02-06 PROCEDURE — 7100000001 HC PACU RECOVERY - ADDTL 15 MIN: Performed by: UROLOGY

## 2024-02-06 PROCEDURE — 3700000000 HC ANESTHESIA ATTENDED CARE: Performed by: UROLOGY

## 2024-02-06 PROCEDURE — 7100000010 HC PHASE II RECOVERY - FIRST 15 MIN: Performed by: UROLOGY

## 2024-02-06 PROCEDURE — 2720000010 HC SURG SUPPLY STERILE: Performed by: UROLOGY

## 2024-02-06 PROCEDURE — 36415 COLL VENOUS BLD VENIPUNCTURE: CPT

## 2024-02-06 PROCEDURE — 3600000003 HC SURGERY LEVEL 3 BASE: Performed by: UROLOGY

## 2024-02-06 PROCEDURE — 7100000000 HC PACU RECOVERY - FIRST 15 MIN: Performed by: UROLOGY

## 2024-02-06 PROCEDURE — 2580000003 HC RX 258: Performed by: UROLOGY

## 2024-02-06 PROCEDURE — 84132 ASSAY OF SERUM POTASSIUM: CPT

## 2024-02-06 PROCEDURE — 6360000002 HC RX W HCPCS: Performed by: NURSE ANESTHETIST, CERTIFIED REGISTERED

## 2024-02-06 PROCEDURE — 7100000011 HC PHASE II RECOVERY - ADDTL 15 MIN: Performed by: UROLOGY

## 2024-02-06 PROCEDURE — 6360000002 HC RX W HCPCS: Performed by: UROLOGY

## 2024-02-06 PROCEDURE — 3700000001 HC ADD 15 MINUTES (ANESTHESIA): Performed by: UROLOGY

## 2024-02-06 PROCEDURE — 2500000003 HC RX 250 WO HCPCS: Performed by: NURSE ANESTHETIST, CERTIFIED REGISTERED

## 2024-02-06 PROCEDURE — 2709999900 HC NON-CHARGEABLE SUPPLY: Performed by: UROLOGY

## 2024-02-06 RX ORDER — PHENAZOPYRIDINE HYDROCHLORIDE 200 MG/1
200 TABLET, FILM COATED ORAL 3 TIMES DAILY PRN
Qty: 9 TABLET | Refills: 1 | Status: SHIPPED | OUTPATIENT
Start: 2024-02-06 | End: 2024-02-09

## 2024-02-06 RX ORDER — HYDROCODONE BITARTRATE AND ACETAMINOPHEN 5; 325 MG/1; MG/1
1 TABLET ORAL EVERY 6 HOURS PRN
Qty: 9 TABLET | Refills: 0 | Status: SHIPPED | OUTPATIENT
Start: 2024-02-06 | End: 2024-02-11

## 2024-02-06 RX ORDER — SODIUM CHLORIDE 9 MG/ML
INJECTION, SOLUTION INTRAVENOUS PRN
Status: DISCONTINUED | OUTPATIENT
Start: 2024-02-06 | End: 2024-02-06 | Stop reason: HOSPADM

## 2024-02-06 RX ORDER — IPRATROPIUM BROMIDE AND ALBUTEROL SULFATE 2.5; .5 MG/3ML; MG/3ML
1 SOLUTION RESPIRATORY (INHALATION) EVERY 4 HOURS PRN
Status: DISCONTINUED | OUTPATIENT
Start: 2024-02-06 | End: 2024-02-06 | Stop reason: HOSPADM

## 2024-02-06 RX ORDER — SODIUM CHLORIDE 0.9 % (FLUSH) 0.9 %
5-40 SYRINGE (ML) INJECTION EVERY 12 HOURS SCHEDULED
Status: DISCONTINUED | OUTPATIENT
Start: 2024-02-06 | End: 2024-02-06 | Stop reason: HOSPADM

## 2024-02-06 RX ORDER — HYDROMORPHONE HYDROCHLORIDE 2 MG/ML
INJECTION, SOLUTION INTRAMUSCULAR; INTRAVENOUS; SUBCUTANEOUS PRN
Status: DISCONTINUED | OUTPATIENT
Start: 2024-02-06 | End: 2024-02-06 | Stop reason: SDUPTHER

## 2024-02-06 RX ORDER — SODIUM CHLORIDE 0.9 % (FLUSH) 0.9 %
5-40 SYRINGE (ML) INJECTION PRN
Status: DISCONTINUED | OUTPATIENT
Start: 2024-02-06 | End: 2024-02-06 | Stop reason: HOSPADM

## 2024-02-06 RX ORDER — EPHEDRINE SULFATE/0.9% NACL/PF 50 MG/5 ML
SYRINGE (ML) INTRAVENOUS PRN
Status: DISCONTINUED | OUTPATIENT
Start: 2024-02-06 | End: 2024-02-06 | Stop reason: SDUPTHER

## 2024-02-06 RX ORDER — KETOROLAC TROMETHAMINE 30 MG/ML
INJECTION, SOLUTION INTRAMUSCULAR; INTRAVENOUS PRN
Status: DISCONTINUED | OUTPATIENT
Start: 2024-02-06 | End: 2024-02-06 | Stop reason: SDUPTHER

## 2024-02-06 RX ORDER — LIDOCAINE HCL/PF 100 MG/5ML
SYRINGE (ML) INJECTION PRN
Status: DISCONTINUED | OUTPATIENT
Start: 2024-02-06 | End: 2024-02-06 | Stop reason: SDUPTHER

## 2024-02-06 RX ORDER — ONDANSETRON 2 MG/ML
INJECTION INTRAMUSCULAR; INTRAVENOUS PRN
Status: DISCONTINUED | OUTPATIENT
Start: 2024-02-06 | End: 2024-02-06 | Stop reason: SDUPTHER

## 2024-02-06 RX ORDER — ROCURONIUM BROMIDE 10 MG/ML
INJECTION, SOLUTION INTRAVENOUS PRN
Status: DISCONTINUED | OUTPATIENT
Start: 2024-02-06 | End: 2024-02-06 | Stop reason: SDUPTHER

## 2024-02-06 RX ORDER — CIPROFLOXACIN 2 MG/ML
400 INJECTION, SOLUTION INTRAVENOUS
Status: COMPLETED | OUTPATIENT
Start: 2024-02-06 | End: 2024-02-06

## 2024-02-06 RX ORDER — PROPOFOL 10 MG/ML
INJECTION, EMULSION INTRAVENOUS CONTINUOUS PRN
Status: DISCONTINUED | OUTPATIENT
Start: 2024-02-06 | End: 2024-02-06 | Stop reason: SDUPTHER

## 2024-02-06 RX ORDER — DEXAMETHASONE SODIUM PHOSPHATE 10 MG/ML
INJECTION, EMULSION INTRAMUSCULAR; INTRAVENOUS PRN
Status: DISCONTINUED | OUTPATIENT
Start: 2024-02-06 | End: 2024-02-06 | Stop reason: SDUPTHER

## 2024-02-06 RX ADMIN — PROPOFOL 120 MG: 10 INJECTION, EMULSION INTRAVENOUS at 11:50

## 2024-02-06 RX ADMIN — HYDROMORPHONE HYDROCHLORIDE 0.6 MG: 2 INJECTION INTRAMUSCULAR; INTRAVENOUS; SUBCUTANEOUS at 11:52

## 2024-02-06 RX ADMIN — HYDROMORPHONE HYDROCHLORIDE 0.4 MG: 2 INJECTION INTRAMUSCULAR; INTRAVENOUS; SUBCUTANEOUS at 12:20

## 2024-02-06 RX ADMIN — ROCURONIUM BROMIDE 50 MG: 10 INJECTION INTRAVENOUS at 11:52

## 2024-02-06 RX ADMIN — SODIUM CHLORIDE: 9 INJECTION, SOLUTION INTRAVENOUS at 10:07

## 2024-02-06 RX ADMIN — CIPROFLOXACIN 400 MG: 2 INJECTION, SOLUTION INTRAVENOUS at 11:50

## 2024-02-06 RX ADMIN — DEXAMETHASONE SODIUM PHOSPHATE 10 MG: 10 INJECTION, EMULSION INTRAMUSCULAR; INTRAVENOUS at 11:50

## 2024-02-06 RX ADMIN — KETOROLAC TROMETHAMINE 15 MG: 30 INJECTION, SOLUTION INTRAMUSCULAR at 12:00

## 2024-02-06 RX ADMIN — SUGAMMADEX 100 MG: 100 INJECTION, SOLUTION INTRAVENOUS at 12:21

## 2024-02-06 RX ADMIN — PROPOFOL 125 MCG/KG/MIN: 10 INJECTION, EMULSION INTRAVENOUS at 11:52

## 2024-02-06 RX ADMIN — SUGAMMADEX 100 MG: 100 INJECTION, SOLUTION INTRAVENOUS at 12:19

## 2024-02-06 RX ADMIN — ONDANSETRON 4 MG: 2 INJECTION INTRAMUSCULAR; INTRAVENOUS at 11:50

## 2024-02-06 RX ADMIN — Medication 20 MG: at 12:00

## 2024-02-06 RX ADMIN — Medication 100 MG: at 11:52

## 2024-02-06 ASSESSMENT — PAIN - FUNCTIONAL ASSESSMENT
PAIN_FUNCTIONAL_ASSESSMENT: 0-10
PAIN_FUNCTIONAL_ASSESSMENT: NONE - DENIES PAIN

## 2024-02-06 NOTE — DISCHARGE INSTRUCTIONS
-OK to dc home.  -Home with starr; you may see some blood in urine in the tubing and this is normal as long as the catheter is draining well. Starr to be removed in office on thursday  -If catheter does not draining, call the office or report to the ER  -Stay well hydrated  -No heavy lifting >20lbs for 6 weeks  -You may see blood with urination on and off for 4 weeks, this is normal and will improve. The most important thing is if the catheter is draining and you are able to empty your bladder.  -You may have burning with urination on and off for 2-3 weeks, this is normal and should improve  - Report to the ER if chest pain, shortness of breath, fever >101.5  - You may take tylenol or motrin OTC as needed for pain  - Take antibiotics as prescribed the day before catheter removal  - Make sure you take stool softeners so that you are not straining during bowel movements    Pt will Follow up with CORDELL FREEMAN MD for a void trial. Call office for follow up.  You may resume your blood thinners in 72 hours.          
Implemented All Fall Risk Interventions:  [place] to call system. Call bell, personal items and telephone within reach. Instruct patient to call for assistance. Room bathroom lighting operational. Non-slip footwear when patient is off stretcher. Physically safe environment: no spills, clutter or unnecessary equipment. Stretcher in lowest position, wheels locked, appropriate side rails in place. Provide visual cue, wrist band, yellow gown, etc. Monitor gait and stability. Monitor for mental status changes and reorient to person, place, and time. Review medications for side effects contributing to fall risk. Reinforce activity limits and safety measures with patient and family.

## 2024-02-06 NOTE — PROGRESS NOTES
Pt returned to [facility] room 17. Vitals and assessment as charted. 0.9 infusing, l to count from PACU. Pt has crackers and water. son at the bedside. Pt and son verbalized understanding of discharge criteria and call light use. Call light in reach.

## 2024-02-06 NOTE — PROGRESS NOTES
Pt has met discharge criteria and states he is ready for discharge to home. IV removed, gauze and tape applied. Dressed in own clothes and personal belongings gathered. Discharge instructions (with opioid medication education information) given to pt and son; pt and son verbalized understanding of discharge instructions, prescriptions and follow up appointments. Pt transported to discharge lobby by [facility] staff.

## 2024-02-06 NOTE — PROGRESS NOTES
Patient oriented to Same Day department and admitted to Same Day Surgery room 17.   Patient verbalized approval for first name, last initial with physician name on unit whiteboard.     Plan of care reviewed with patient.   Patient room whiteboard filled out and discussed with patient and responsible adult.   Patient and responsible adult offered Same Day Welcome Packet to review.    Call light in reach.   Bed in lowest position, locked, with one bed rail up.   SCDs and warming blanket in place.  Appropriate arm bands on patient.   Bathroom offered.   All questions and concerns of patient addressed.        Meds to Beds:   Patient informed of [facility] Meds to Beds program during admission. Patient has declined use of program.

## 2024-02-06 NOTE — H&P
glycol 0.4-0.3 % (SYSTANE) 0.4-0.3 % ophthalmic solution Place into both eyes as needed for Dry Eyes ceived the following from [facility]: Outside name: peg 400-propylene glycol (Systane, propylene glycoL,) 0.4-0.3 % drop    Automatic Reconciliation, Ar   tamsulosin (FLOMAX) 0.4 MG capsule Take 1 capsule by mouth daily 22   Automatic Reconciliation, Ar       Allergies:  Morphine and Iodinated contrast media    Social History:    Social History     Socioeconomic History    Marital status:      Spouse name: Not on file    Number of children: Not on file    Years of education: Not on file    Highest education level: Not on file   Occupational History    Not on file   Tobacco Use    Smoking status: Former     Current packs/day: 0.00     Types: Cigarettes     Quit date: 1985     Years since quittin.1    Smokeless tobacco: Never   Vaping Use    Vaping Use: Never used   Substance and Sexual Activity    Alcohol use: Yes    Drug use: Not on file    Sexual activity: Not Currently   Other Topics Concern    Not on file   Social History Narrative    Not on file     Social Determinants of Health     Financial Resource Strain: Not on file   Food Insecurity: Not on file   Transportation Needs: Not on file   Physical Activity: Not on file   Stress: Not on file   Social Connections: Not on file   Intimate Partner Violence: Not on file   Housing Stability: Not on file       Family History:    Family History   Problem Relation Age of Onset    Cancer Mother     Cancer Sister     Cancer Brother     Diabetes Neg Hx     Heart Disease Neg Hx     High Blood Pressure Neg Hx     Stroke Neg Hx        REVIEW OF SYSTEMS:  Constitutional: negative  Eyes: negative  Respiratory: negative  Cardiovascular: negative  Gastrointestinal: negative  Genitourinary: no acute issues  Musculoskeletal: negative  Skin: negative   Neurological: negative  Hematological/Lymphatic: negative  Psychological: negative    Physical

## 2024-02-06 NOTE — ANESTHESIA PRE PROCEDURE
\"LABABO\", \"LABRH\"    Drug/Infectious Status (If Applicable):  No results found for: \"HIV\", \"HEPCAB\"    COVID-19 Screening (If Applicable):   Lab Results   Component Value Date/Time    COVID19 Not Detected 06/14/2023 12:45 AM           Anesthesia Evaluation  Patient summary reviewed   no history of anesthetic complications:   Airway: Mallampati: II  TM distance: >3 FB   Neck ROM: full  Mouth opening: > = 3 FB   Dental:    (+) upper dentures and lower dentures      Pulmonary:normal exam                               Cardiovascular:    (+) hypertension:, valvular problems/murmurs (s/p AVR):, dysrhythmias: atrial fibrillation, CHF: diastolic      ECG reviewed                        Neuro/Psych:               GI/Hepatic/Renal:   (+) GERD:          Endo/Other:    (+) hypothyroidism::..                 Abdominal:             Vascular:          Other Findings:             Anesthesia Plan      general     ASA 3       Induction: intravenous.    MIPS: Postoperative opioids intended and Prophylactic antiemetics administered.  Anesthetic plan and risks discussed with patient and child/children.      Plan discussed with CRNA and surgical team.                    LURDES MARTINEZ MD   2/6/2024

## 2024-02-06 NOTE — ANESTHESIA POSTPROCEDURE EVALUATION
Department of Anesthesiology  Postprocedure Note    Patient: Samuel Gupta  MRN: [id number]  YOB: 1935  Date of evaluation: 2/6/2024    Procedure Summary       Date: 02/06/24 Room / Location: [facility] OR 06 / [facility] OR    Anesthesia Start: 1144 Anesthesia Stop: 1226    Procedure: Cystoscopy Greenlight Photovaporization of Prostate Diagnosis:       Benign prostatic hyperplasia, unspecified whether lower urinary tract symptoms present      (Benign prostatic hyperplasia, unspecified whether lower urinary tract symptoms present [N40.0])    Surgeons: Jimbo Hester MD Responsible Provider: Polo Clements MD    Anesthesia Type: general ASA Status: 3            Anesthesia Type: No value filed.    Delia Phase I: Delia Score: 10    Delia Phase II: Delia Score: 10    Anesthesia Post Evaluation    Patient location during evaluation: PACU  Patient participation: complete - patient participated  Level of consciousness: awake and alert  Airway patency: patent  Nausea & Vomiting: no nausea and no vomiting  Cardiovascular status: hemodynamically stable  Respiratory status: acceptable  Hydration status: euvolemic  Pain management: adequate      [facility]  POST-ANESTHESIA NOTE       Name:  Samuel Gupta                                         Age:  88 y.o.  MRN:  [id number]      Last Vitals:  BP (!) 155/65   Pulse 80   Temp 97.6 °F (36.4 °C) (Temporal)   Resp 16   Ht 1.676 m (5' 6\")   Wt 64.2 kg (141 lb 9.6 oz)   SpO2 98%   BMI 22.85 kg/m²   Patient Vitals for the past 4 hrs:   BP Temp Temp src Pulse Resp SpO2   02/06/24 1400 (!) 155/65 -- -- 80 16 98 %   02/06/24 1330 (!) 157/64 -- -- 78 16 98 %   02/06/24 1315 (!) 155/76 97.6 °F (36.4 °C) Temporal 75 16 98 %   02/06/24 1255 (!) 151/67 -- -- 72 19 95 %   02/06/24 1250 (!) 150/68 -- -- 70 18 93 %   02/06/24 1245 (!) 153/68 -- -- 71 13 98 %   02/06/24 1240 (!) 169/68 -- -- 68 14 100 %   02/06/24 1235 (!) 171/77 -- -- 69 16 100 %   02/06/24 1230

## 2024-02-06 NOTE — BRIEF OP NOTE
Brief Postoperative Note      Patient: Samuel Gupta  YOB: 1935  MRN: [id number]    Date of Procedure: 2/6/2024    Pre-Op Diagnosis Codes:     * Benign prostatic hyperplasia, unspecified whether lower urinary tract symptoms present [N40.0]    Post-Op Diagnosis: Same       Procedure(s):  Cystoscopy Greenlight Photovaporization of Prostate    Surgeon(s):  Jimbo Hester MD    Assistant:  * No surgical staff found *    Anesthesia: General    Estimated Blood Loss (mL): Minimal    Complications: None    Specimens:   * No specimens in log *    Implants:  * No implants in log *      Drains: * No LDAs found *    Findings: starr out Thursday. poc 2-3 weeks w ernst. If no improvement in 2-3 months likely needs PNE (this was a reresection)      Electronically signed by JIMBO HESTER MD on 2/6/2024 at 12:45 PM

## 2024-02-06 NOTE — PROGRESS NOTES
1225: pt arrives to pacu, respirations unlabored on 6L nasal cannula. Pt awakens to voice, follows commnads. Denies pain at this time. CBI Running fast, urine dilluted, reduced to slow gtt. 1200 ml out.   1339: pt awake and denies pain at this time. Weaned to 2L NC  1247: weaned to room air. Urine colorless.   1255: pt meets criteria for discharge from pacu at this time. pt transported to [facility] in stable condition.  1300: report given to Cami SERRANO

## 2024-02-08 ENCOUNTER — NURSE ONLY (OUTPATIENT)
Dept: UROLOGY | Age: 89
End: 2024-02-08

## 2024-02-08 DIAGNOSIS — N40.1 BENIGN PROSTATIC HYPERPLASIA WITH URINARY OBSTRUCTION: Primary | ICD-10-CM

## 2024-02-08 DIAGNOSIS — N13.8 BENIGN PROSTATIC HYPERPLASIA WITH URINARY OBSTRUCTION: Primary | ICD-10-CM

## 2024-02-08 PROCEDURE — NBSRV NON-BILLABLE SERVICE: Performed by: NURSE PRACTITIONER

## 2024-02-08 NOTE — PROGRESS NOTES
Patient has given me verbal consent to perform starr removal  Yes    20 cc of water deflated from starr balloon. 22 Fr starr removed without difficulty.          Pt will drink fluids and report to ER in 6-8 hours if patient unable to urinate.      F/u with provider as scheduled.

## 2024-02-28 ENCOUNTER — OFFICE VISIT (OUTPATIENT)
Dept: UROLOGY | Age: 89
End: 2024-02-28
Payer: MEDICARE

## 2024-02-28 VITALS
DIASTOLIC BLOOD PRESSURE: 64 MMHG | WEIGHT: 145 LBS | HEIGHT: 66 IN | BODY MASS INDEX: 23.3 KG/M2 | SYSTOLIC BLOOD PRESSURE: 128 MMHG

## 2024-02-28 DIAGNOSIS — N13.8 BENIGN PROSTATIC HYPERPLASIA WITH URINARY OBSTRUCTION: Primary | ICD-10-CM

## 2024-02-28 DIAGNOSIS — N40.1 BENIGN PROSTATIC HYPERPLASIA WITH URINARY OBSTRUCTION: Primary | ICD-10-CM

## 2024-02-28 LAB
BILIRUBIN URINE: NEGATIVE
BLOOD URINE, POC: ABNORMAL
CHARACTER, URINE: CLEAR
COLOR, URINE: YELLOW
GLUCOSE URINE: NEGATIVE MG/DL
KETONES, URINE: NEGATIVE
LEUKOCYTE CLUMPS, URINE: ABNORMAL
NITRITE, URINE: NEGATIVE
PH, URINE: 6 (ref 5–9)
POST VOID RESIDUAL (PVR): 136 ML
PROTEIN, URINE: 100 MG/DL
SPECIFIC GRAVITY, URINE: 1.01 (ref 1–1.03)
UROBILINOGEN, URINE: 0.2 EU/DL (ref 0–1)

## 2024-02-28 PROCEDURE — 81003 URINALYSIS AUTO W/O SCOPE: CPT

## 2024-02-28 PROCEDURE — 51798 US URINE CAPACITY MEASURE: CPT

## 2024-02-28 PROCEDURE — 99024 POSTOP FOLLOW-UP VISIT: CPT

## 2024-02-28 RX ORDER — DUTASTERIDE 0.5 MG/1
0.5 CAPSULE, LIQUID FILLED ORAL DAILY
Qty: 90 CAPSULE | Refills: 0 | Status: SHIPPED | OUTPATIENT
Start: 2024-02-28

## 2024-02-28 RX ORDER — PREDNISONE 10 MG/1
TABLET ORAL
COMMUNITY
Start: 2024-02-27

## 2024-02-28 RX ORDER — TAMSULOSIN HYDROCHLORIDE 0.4 MG/1
0.4 CAPSULE ORAL DAILY
Qty: 90 CAPSULE | Refills: 0 | Status: SHIPPED | OUTPATIENT
Start: 2024-02-28

## 2024-02-28 NOTE — PROGRESS NOTES
Warm and dry.  Psych: Normal mood and affect.  Genitourinary: Bladder non-distended and non-tender.      Assessment and Plan   BPH w/ LUTS  - S/P Cystoscopy, Greenlight PVP. Doing very well. Stream and emptying is much improved. Post-op irritative voiding symptoms and hematuria/tissue passage resolved.   - Patient to continue Flomax until 1 month prior to post-op check then trial going off. Can continue Avodart for prevention of regrowth vs stopping it- patient choice.    *Follow-up in 3 months with Dr. Hester.    Nitin Castillo PA-C  Urology

## 2024-03-19 ENCOUNTER — NURSE ONLY (OUTPATIENT)
Age: 89
End: 2024-03-19

## 2024-03-19 LAB
ALBUMIN SERPL BCG-MCNC: 3.6 G/DL (ref 3.5–5.1)
ALP SERPL-CCNC: 77 U/L (ref 38–126)
ALT SERPL W/O P-5'-P-CCNC: 20 U/L (ref 11–66)
ANION GAP SERPL CALC-SCNC: 14 MEQ/L (ref 8–16)
AST SERPL-CCNC: 25 U/L (ref 5–40)
BASOPHILS ABSOLUTE: 0.1 THOU/MM3 (ref 0–0.1)
BASOPHILS NFR BLD AUTO: 0.8 %
BILIRUB SERPL-MCNC: 0.4 MG/DL (ref 0.3–1.2)
BUN SERPL-MCNC: 47 MG/DL (ref 7–22)
CALCIUM SERPL-MCNC: 9.4 MG/DL (ref 8.5–10.5)
CHLORIDE SERPL-SCNC: 102 MEQ/L (ref 98–111)
CO2 SERPL-SCNC: 26 MEQ/L (ref 23–33)
CREAT SERPL-MCNC: 2.6 MG/DL (ref 0.4–1.2)
DEPRECATED RDW RBC AUTO: 53 FL (ref 35–45)
EOSINOPHIL NFR BLD AUTO: 3.2 %
EOSINOPHILS ABSOLUTE: 0.2 THOU/MM3 (ref 0–0.4)
ERYTHROCYTE [DISTWIDTH] IN BLOOD BY AUTOMATED COUNT: 14.9 % (ref 11.5–14.5)
GFR SERPL CREATININE-BSD FRML MDRD: 23 ML/MIN/1.73M2
GLUCOSE SERPL-MCNC: 72 MG/DL (ref 70–108)
HCT VFR BLD AUTO: 30.3 % (ref 42–52)
HGB BLD-MCNC: 9.5 GM/DL (ref 14–18)
IMM GRANULOCYTES # BLD AUTO: 0.02 THOU/MM3 (ref 0–0.07)
IMM GRANULOCYTES NFR BLD AUTO: 0.3 %
LYMPHOCYTES ABSOLUTE: 1 THOU/MM3 (ref 1–4.8)
LYMPHOCYTES NFR BLD AUTO: 14.7 %
MAGNESIUM SERPL-MCNC: 2.5 MG/DL (ref 1.6–2.4)
MCH RBC QN AUTO: 30.7 PG (ref 26–33)
MCHC RBC AUTO-ENTMCNC: 31.4 GM/DL (ref 32.2–35.5)
MCV RBC AUTO: 98.1 FL (ref 80–94)
MONOCYTES ABSOLUTE: 0.4 THOU/MM3 (ref 0.4–1.3)
MONOCYTES NFR BLD AUTO: 6.5 %
NEUTROPHILS NFR BLD AUTO: 74.5 %
NRBC BLD AUTO-RTO: 0 /100 WBC
PLATELET # BLD AUTO: 286 THOU/MM3 (ref 130–400)
PMV BLD AUTO: 11.2 FL (ref 9.4–12.4)
POTASSIUM SERPL-SCNC: 3.8 MEQ/L (ref 3.5–5.2)
PROT SERPL-MCNC: 7 G/DL (ref 6.1–8)
RBC # BLD AUTO: 3.09 MILL/MM3 (ref 4.7–6.1)
SEGMENTED NEUTROPHILS ABSOLUTE COUNT: 5 THOU/MM3 (ref 1.8–7.7)
SODIUM SERPL-SCNC: 142 MEQ/L (ref 135–145)
WBC # BLD AUTO: 6.7 THOU/MM3 (ref 4.8–10.8)

## 2024-03-20 LAB
FOLATE SERPL-MCNC: 11.7 NG/ML (ref 4.8–24.2)
VIT B12 SERPL-MCNC: 708 PG/ML (ref 211–911)

## 2024-03-23 LAB — PYRIDOXAL PHOS SERPL-SCNC: 24.9 NMOL/L (ref 20–125)

## 2024-03-25 LAB — VIT B1 PYROPHOSHATE BLD-SCNC: 149 NMOL/L (ref 70–180)

## 2024-05-28 ENCOUNTER — OFFICE VISIT (OUTPATIENT)
Dept: UROLOGY | Age: 89
End: 2024-05-28
Payer: MEDICARE

## 2024-05-28 VITALS — BODY MASS INDEX: 22.18 KG/M2 | RESPIRATION RATE: 16 BRPM | HEIGHT: 66 IN | WEIGHT: 138 LBS

## 2024-05-28 DIAGNOSIS — N40.1 BENIGN PROSTATIC HYPERPLASIA WITH URINARY OBSTRUCTION: ICD-10-CM

## 2024-05-28 DIAGNOSIS — R30.0 DYSURIA: Primary | ICD-10-CM

## 2024-05-28 DIAGNOSIS — N13.8 BENIGN PROSTATIC HYPERPLASIA WITH URINARY OBSTRUCTION: ICD-10-CM

## 2024-05-28 LAB
BILIRUBIN, URINE: NEGATIVE
BLOOD URINE, POC: NEGATIVE
CHARACTER, URINE: ABNORMAL
COLOR: ABNORMAL
GLUCOSE URINE: NEGATIVE MG/DL
KETONES, URINE: NEGATIVE
LEUKOCYTE CLUMPS, URINE: ABNORMAL
NITRITE, URINE: NEGATIVE
PH, URINE: 7 (ref 5–9)
PROTEIN, URINE: 30 MG/DL
SPECIFIC GRAVITY UA: 1.02 (ref 1–1.03)
UROBILINOGEN, URINE: 0.2 EU/DL (ref 0–1)

## 2024-05-28 PROCEDURE — G8420 CALC BMI NORM PARAMETERS: HCPCS | Performed by: UROLOGY

## 2024-05-28 PROCEDURE — 1123F ACP DISCUSS/DSCN MKR DOCD: CPT | Performed by: UROLOGY

## 2024-05-28 PROCEDURE — G8427 DOCREV CUR MEDS BY ELIG CLIN: HCPCS | Performed by: UROLOGY

## 2024-05-28 PROCEDURE — 99214 OFFICE O/P EST MOD 30 MIN: CPT | Performed by: UROLOGY

## 2024-05-28 PROCEDURE — 81003 URINALYSIS AUTO W/O SCOPE: CPT | Performed by: UROLOGY

## 2024-05-28 PROCEDURE — 1036F TOBACCO NON-USER: CPT | Performed by: UROLOGY

## 2024-05-28 NOTE — PROGRESS NOTES
Jimbo Hester MD.    [facility] [place] [facility]  [facility] UROLOGY  [address]  [address]  [facility] [address]  Dept: [phone]  Dept Fax: [phone]  Loc: [phone]    [facility] Urology Office Note -     Patient:  Samuel Gupta  YOB: 1935    The patient is a 89 y.o. male who presents today for evaluation of the following problems:   Chief Complaint   Patient presents with    Follow-up     Follow-up in 3 months with Dr. Hester.    Benign Prostatic Hypertrophy    Dysuria    referred/consultation requested by Lee Díaz MD.    History of Present Illness:    BPH  Auass 27. Incomplete emptying, intermittency, weak stream, straining  Had a procedure done with urologist at [facility] and then myself  On avodart and flomax    Requested/reviewed records from Lee Díaz MD office and/or outside physician/EMR    (Patient's old records have been requested, reviewed and pertinent findings summarized in today's note.)    Procedures Today: N/A      Last several PSA's:  Lab Results   Component Value Date    PSA 0.32 10/13/2023       Last total testosterone:  No results found for: \"TESTOSTERONE\"    Urinalysis today:  Results for POC orders placed in visit on 05/28/24   POCT Urinalysis No Micro (Auto)   Result Value Ref Range    Glucose, Ur Negative NEGATIVE mg/dl    Bilirubin, Urine Negative     Ketones, Urine Negative NEGATIVE    Specific Gravity, UA 1.020 1.002 - 1.030    Blood, UA POC Negative NEGATIVE    pH, Urine 7.00 5.0 - 9.0    Protein, Urine 30 (A) NEGATIVE mg/dl    Urobilinogen, Urine 0.20 0.0 - 1.0 eu/dl    Nitrite, Urine Negative NEGATIVE    Leukocyte Clumps, Urine Small (A) NEGATIVE    Color, UA Dark yellow (A) YELLOW-STRAW    Character, Urine Slightly Cloudy [id number]       Last BUN and creatinine:  Lab Results   Component Value Date    BUN 47 (H) 03/19/2024     Lab Results   Component Value Date    CREATININE 2.6 (H) 03/19/2024         Imaging

## 2024-05-29 ENCOUNTER — TELEPHONE (OUTPATIENT)
Dept: UROLOGY | Age: 89
End: 2024-05-29

## 2024-05-29 NOTE — TELEPHONE ENCOUNTER
DO NOT TAKE  FISH OIL, MOBIC, IBUPROFEN, MOTRIN-LIKE DRUGS AND ANY MULTIVITAMINS OR OVER THE COUNTER SUPPLEMENTS 14 DAYS PRIOR     HOLD ASPIRIN 5 DAYS PRIOR TO SURGERY    HOLD LASIX THE MORNING OF SURGERY    OK TO USE TYLENOL    You have been scheduled for the procedure marked below:      Procedure: PERIPHERAL NERVE EVALUATION           Date: 6/11/2024                     Time:12:30PM    Place of Service: [facility], [address] [place] [address]                                Phone # [phone]  Opt # 4       FOLLOW UP APPOINTMENT FOR LEAD REMOVAL IS ON 6/17/2024 AT 12:45PM. MAKE SURE YOU BRING YOUR COMPLETED VOIDING DIARY WITH YOU.                  
___Acceptable risk for surgery ___Risk Unacceptable-Communication to Follow  Comments:_____________________________________________________  ________________________________________________________________________  Physician ___________________________  Date:_______________  Physician Printed Name:  _________________________    Fax  [phone]

## 2024-06-03 ENCOUNTER — NURSE ONLY (OUTPATIENT)
Age: 89
End: 2024-06-03

## 2024-06-03 LAB
ALBUMIN SERPL BCG-MCNC: 3.8 G/DL (ref 3.5–5.1)
ALP SERPL-CCNC: 57 U/L (ref 38–126)
ALT SERPL W/O P-5'-P-CCNC: 9 U/L (ref 11–66)
ANION GAP SERPL CALC-SCNC: 11 MEQ/L (ref 8–16)
AST SERPL-CCNC: 20 U/L (ref 5–40)
BASOPHILS ABSOLUTE: 0 THOU/MM3 (ref 0–0.1)
BASOPHILS NFR BLD AUTO: 0.7 %
BILIRUB SERPL-MCNC: 0.3 MG/DL (ref 0.3–1.2)
BUN SERPL-MCNC: 47 MG/DL (ref 7–22)
CALCIUM SERPL-MCNC: 9 MG/DL (ref 8.5–10.5)
CHLORIDE SERPL-SCNC: 108 MEQ/L (ref 98–111)
CHOLEST SERPL-MCNC: 95 MG/DL (ref 100–199)
CO2 SERPL-SCNC: 26 MEQ/L (ref 23–33)
CREAT SERPL-MCNC: 2.7 MG/DL (ref 0.4–1.2)
DEPRECATED RDW RBC AUTO: 57.2 FL (ref 35–45)
EOSINOPHIL NFR BLD AUTO: 2.8 %
EOSINOPHILS ABSOLUTE: 0.2 THOU/MM3 (ref 0–0.4)
ERYTHROCYTE [DISTWIDTH] IN BLOOD BY AUTOMATED COUNT: 15.8 % (ref 11.5–14.5)
FERRITIN SERPL IA-MCNC: 160 NG/ML (ref 22–322)
GFR SERPL CREATININE-BSD FRML MDRD: 22 ML/MIN/1.73M2
GLUCOSE SERPL-MCNC: 107 MG/DL (ref 70–108)
HCT VFR BLD AUTO: 31.2 % (ref 42–52)
HDLC SERPL-MCNC: 36 MG/DL
HGB BLD-MCNC: 9.7 GM/DL (ref 14–18)
IMM GRANULOCYTES # BLD AUTO: 0.01 THOU/MM3 (ref 0–0.07)
IMM GRANULOCYTES NFR BLD AUTO: 0.2 %
LDLC SERPL CALC-MCNC: 49 MG/DL
LYMPHOCYTES ABSOLUTE: 1.2 THOU/MM3 (ref 1–4.8)
LYMPHOCYTES NFR BLD AUTO: 21.3 %
MAGNESIUM SERPL-MCNC: 2.2 MG/DL (ref 1.6–2.4)
MCH RBC QN AUTO: 31 PG (ref 26–33)
MCHC RBC AUTO-ENTMCNC: 31.1 GM/DL (ref 32.2–35.5)
MCV RBC AUTO: 99.7 FL (ref 80–94)
MONOCYTES ABSOLUTE: 0.4 THOU/MM3 (ref 0.4–1.3)
MONOCYTES NFR BLD AUTO: 6.7 %
NEUTROPHILS ABSOLUTE: 4 THOU/MM3 (ref 1.8–7.7)
NEUTROPHILS NFR BLD AUTO: 68.3 %
NRBC BLD AUTO-RTO: 0 /100 WBC
PLATELET # BLD AUTO: 202 THOU/MM3 (ref 130–400)
PMV BLD AUTO: 11 FL (ref 9.4–12.4)
POTASSIUM SERPL-SCNC: 4.2 MEQ/L (ref 3.5–5.2)
PROT SERPL-MCNC: 6.3 G/DL (ref 6.1–8)
RBC # BLD AUTO: 3.13 MILL/MM3 (ref 4.7–6.1)
SODIUM SERPL-SCNC: 145 MEQ/L (ref 135–145)
T4 FREE SERPL-MCNC: 1.71 NG/DL (ref 0.93–1.68)
TRIGL SERPL-MCNC: 50 MG/DL (ref 0–199)
TSH SERPL DL<=0.005 MIU/L-ACNC: 7.77 UIU/ML (ref 0.4–4.2)
VIT B12 SERPL-MCNC: 388 PG/ML (ref 211–911)
WBC # BLD AUTO: 5.8 THOU/MM3 (ref 4.8–10.8)

## 2024-06-06 ENCOUNTER — OFFICE VISIT (OUTPATIENT)
Dept: NEPHROLOGY | Age: 89
End: 2024-06-06
Payer: MEDICARE

## 2024-06-06 VITALS
HEIGHT: 67 IN | HEART RATE: 62 BPM | WEIGHT: 137 LBS | BODY MASS INDEX: 21.5 KG/M2 | OXYGEN SATURATION: 98 % | DIASTOLIC BLOOD PRESSURE: 63 MMHG | SYSTOLIC BLOOD PRESSURE: 126 MMHG

## 2024-06-06 DIAGNOSIS — N28.1 RENAL CYST: ICD-10-CM

## 2024-06-06 DIAGNOSIS — I12.9 HYPERTENSIVE RENAL DISEASE, STAGE 1 THROUGH STAGE 4 OR UNSPECIFIED CHRONIC KIDNEY DISEASE: ICD-10-CM

## 2024-06-06 DIAGNOSIS — N18.4 CKD (CHRONIC KIDNEY DISEASE), STAGE IV (HCC): Primary | ICD-10-CM

## 2024-06-06 PROCEDURE — 99213 OFFICE O/P EST LOW 20 MIN: CPT | Performed by: INTERNAL MEDICINE

## 2024-06-06 PROCEDURE — G8420 CALC BMI NORM PARAMETERS: HCPCS | Performed by: INTERNAL MEDICINE

## 2024-06-06 PROCEDURE — 1036F TOBACCO NON-USER: CPT | Performed by: INTERNAL MEDICINE

## 2024-06-06 PROCEDURE — 1123F ACP DISCUSS/DSCN MKR DOCD: CPT | Performed by: INTERNAL MEDICINE

## 2024-06-06 PROCEDURE — G8427 DOCREV CUR MEDS BY ELIG CLIN: HCPCS | Performed by: INTERNAL MEDICINE

## 2024-06-06 NOTE — PROGRESS NOTES
symptoms. Seeing [facility] urology and scheduled for intervention. D/W pt and son.      2. HTN: on clonidine patch and hydralazine. BP is stable as of office visit. Monitor at home.   3. Hx valve replacement: follows with Dr. Manriquez  4. Anemia in CKD: Monitor  5. Ex-smoking  6. BPH with LUTS s/p TURP: seeing urology.   7. Kidney stone: advised low salt diet, low red meat intake, increase water intake.         Orders Placed This Encounter   Procedures    Basic Metabolic Panel    Hemoglobin and Hematocrit    PTH, Intact    Phosphorus     Return in about 6 months (around 12/6/2024).      Song Phan MD  [facility]

## 2024-06-11 ENCOUNTER — HOSPITAL ENCOUNTER (OUTPATIENT)
Dept: UROLOGY | Age: 89
Discharge: HOME | End: 2024-06-11
Payer: MEDICARE

## 2024-06-11 VITALS
HEIGHT: 67 IN | SYSTOLIC BLOOD PRESSURE: 127 MMHG | DIASTOLIC BLOOD PRESSURE: 62 MMHG | BODY MASS INDEX: 21.41 KG/M2 | RESPIRATION RATE: 16 BRPM | WEIGHT: 136.4 LBS | HEART RATE: 62 BPM | TEMPERATURE: 97.5 F | OXYGEN SATURATION: 97 %

## 2024-06-11 LAB
BILIRUBIN, URINE: NEGATIVE
BLOOD URINE, POC: NEGATIVE
CHARACTER, URINE: ABNORMAL
COLOR: YELLOW
GLUCOSE URINE: NEGATIVE MG/DL
KETONES, URINE: NEGATIVE
LEUKOCYTE CLUMPS, URINE: ABNORMAL
NITRITE, URINE: NEGATIVE
PH, URINE: 5.5 (ref 5–9)
PROTEIN, URINE: NEGATIVE MG/DL
SPECIFIC GRAVITY UA: 1.01 (ref 1–1.03)
UROBILINOGEN, URINE: 0.2 EU/DL (ref 0–1)

## 2024-06-11 PROCEDURE — 64561 IMPLANT NEUROELECTRODES: CPT

## 2024-06-11 PROCEDURE — C1897 LEAD, NEUROSTIM TEST KIT: HCPCS

## 2024-06-11 PROCEDURE — C1778 LEAD, NEUROSTIMULATOR: HCPCS

## 2024-06-11 PROCEDURE — 2500000003 HC RX 250 WO HCPCS: Performed by: UROLOGY

## 2024-06-11 RX ORDER — LIDOCAINE HYDROCHLORIDE 20 MG/ML
20 INJECTION, SOLUTION INFILTRATION; PERINEURAL ONCE
Status: COMPLETED | OUTPATIENT
Start: 2024-06-11 | End: 2024-06-11

## 2024-06-11 RX ADMIN — LIDOCAINE HYDROCHLORIDE 20 ML: 20 INJECTION, SOLUTION INFILTRATION; PERINEURAL at 12:33

## 2024-06-11 NOTE — DISCHARGE INSTRUCTIONS
NO showering - sponge bath only until follow up  NO bending, twisting, pulling until follow up  NO lifting greater than 5 pounds until follow up  DO NOT CHANGE DRESSING    Follow up in office with TOYA Taveras on 06/17/2024 at 12:45pm for lead pull and to collect voiding diary.

## 2024-06-17 ENCOUNTER — HOSPITAL ENCOUNTER (OUTPATIENT)
Age: 89
Discharge: HOME | End: 2024-06-17
Payer: MEDICARE

## 2024-06-17 ENCOUNTER — TELEPHONE (OUTPATIENT)
Dept: UROLOGY | Age: 89
End: 2024-06-17

## 2024-06-17 ENCOUNTER — OFFICE VISIT (OUTPATIENT)
Dept: UROLOGY | Age: 89
End: 2024-06-17
Payer: MEDICARE

## 2024-06-17 ENCOUNTER — HOSPITAL ENCOUNTER (OUTPATIENT)
Dept: GENERAL RADIOLOGY | Age: 89
Discharge: HOME | End: 2024-06-17
Payer: MEDICARE

## 2024-06-17 VITALS — HEIGHT: 67 IN | RESPIRATION RATE: 16 BRPM | BODY MASS INDEX: 21.6 KG/M2 | WEIGHT: 137.6 LBS

## 2024-06-17 DIAGNOSIS — R30.0 DYSURIA: Primary | ICD-10-CM

## 2024-06-17 DIAGNOSIS — Z01.818 PRE-OP TESTING: ICD-10-CM

## 2024-06-17 DIAGNOSIS — N13.8 BENIGN PROSTATIC HYPERPLASIA WITH URINARY OBSTRUCTION: ICD-10-CM

## 2024-06-17 DIAGNOSIS — N32.81 OAB (OVERACTIVE BLADDER): ICD-10-CM

## 2024-06-17 DIAGNOSIS — N40.1 BENIGN PROSTATIC HYPERPLASIA WITH URINARY OBSTRUCTION: ICD-10-CM

## 2024-06-17 DIAGNOSIS — Z01.818 PRE-OP TESTING: Primary | ICD-10-CM

## 2024-06-17 DIAGNOSIS — R30.0 DYSURIA: ICD-10-CM

## 2024-06-17 DIAGNOSIS — N40.1 BENIGN LOCALIZED PROSTATIC HYPERPLASIA WITH LOWER URINARY TRACT SYMPTOMS (LUTS): ICD-10-CM

## 2024-06-17 PROCEDURE — G8420 CALC BMI NORM PARAMETERS: HCPCS

## 2024-06-17 PROCEDURE — 71046 X-RAY EXAM CHEST 2 VIEWS: CPT

## 2024-06-17 PROCEDURE — 1036F TOBACCO NON-USER: CPT

## 2024-06-17 PROCEDURE — G8427 DOCREV CUR MEDS BY ELIG CLIN: HCPCS

## 2024-06-17 PROCEDURE — 1123F ACP DISCUSS/DSCN MKR DOCD: CPT

## 2024-06-17 PROCEDURE — 99213 OFFICE O/P EST LOW 20 MIN: CPT

## 2024-06-17 NOTE — TELEPHONE ENCOUNTER
DO NOT TAKE  FISH OIL, MOBIC, IBUPROFEN, MOTRIN-LIKE DRUGS AND ANY MULTIVITAMINS OR OVER THE COUNTER SUPPLEMENTS 14 DAYS PRIOR TO SURGERY.    HOLD ASPIRIN 5 DAYS PRIOR TO SURGERY    HOLD THE LASIX THE MORNING OF SURGERY    IF YOU TAKE GLUCOPHAGE, TRADJENTA, METFORMIN OR JANUMET, HOLD 2 DAYS PRIOR TO SURGERY    MUST HAVE AN ADULT OVER THE AGE OF 18 WITH YOU AT THE TIME OF THE DISCHARGE         Samuel Gupta 1935     Surgical Physician: Dr. Hester      You have been scheduled for the procedure marked below:      Surgery: Stage 1 & 2 Permanent Interstim Placement        Date: 7/5/24     Anesthesia:  MAC     Place of Service: [facility] --Second Floor Same Day Surgery         Arrive to same day surgery at:  9:00 am  (Surgery time is subject to change)      INSTRUCTIONS AS MARKED BELOW:    1.  DO NOT eat or drink anything after midnight before surgery.  2.  We prefer you shower or bathe with an antibacterial soap (Dial) the morning of surgery.  3  Please bring a current medication list, photo ID and insurance card(s) with you  4. Okay to take Tylenol  5. Take blood pressure or heart medication as directed, if taken in the morning take with a small sip of water  6.The office will call you in 1-2 days after your procedure to schedule a follow up.    DATE SENSITIVE PRE OP TESTING:    TO AVOID YOUR SURGERY BEING CANCELLED DO ON THE DATE LISTED *WALK IN *NO APPOINTMENT.      DO THE PRE OP CHEST XRAY NOW. ORDER GIVEN        Date: 6/17/2024

## 2024-06-17 NOTE — TELEPHONE ENCOUNTER
Patient is scheduled for surgery with  on 7/5/24. Surgery consent to be done on arrival. Dr. Barragan to clear. Patient to do pre op chest xray now. Surgery instructions gone over with patient verbally in the office or mailed to the patient.     Patient informed an adult over the age of 18 must be with them at the time of surgery and upon discharge

## 2024-06-17 NOTE — TELEPHONE ENCOUNTER
Auth # [id number] for CPT 43852 and 10009  Good from 7/5/24 to 8/31/24    CPT 57585 no pre cert needed  Ref # [id number]

## 2024-06-17 NOTE — PROGRESS NOTES
finger    HERNIA REPAIR      LUMBAR LAMINECTOMY  1970    OTHER SURGICAL HISTORY  06/08/2020    Thoracic aortic aneurysm repair with aortic root replacement (N/A) Procedure: REPLACEMENT, AORTIC ROOT AND ASCENDING AORTA, WITH CARDIOPULMONARY BYPASS;  Surgeon: Lawrence Springer MD    TOTAL KNEE ARTHROPLASTY Bilateral     TURP      TURP N/A 2/6/2024    Cystoscopy Greenlight Photovaporization of Prostate performed by Jimbo Hester MD at [facility] OR    UROLOGICAL SURGERY  09/21/2021    Transurethral resection of bladder tumor (N/A) Procedure: CYSTOSCOPY, WITH FULGURATION, BLADDER BIOPSY, AND RETROGRADE PYELOGRAM;  Surgeon: Anthony Covarrubias MD    UROLOGICAL SURGERY  09/25/2021    Cystoscopy, clot evacuation, fulguration w/ TURP (transurethral resection of prostate). Dr. Lionel Scott     Family History   Problem Relation Age of Onset    Cancer Mother     Cancer Sister     Cancer Brother     Diabetes Neg Hx     Heart Disease Neg Hx     High Blood Pressure Neg Hx     Stroke Neg Hx      Outpatient Medications Marked as Taking for the 6/17/24 encounter (Office Visit) with Nitin Castillo PA-C   Medication Sig Dispense Refill    furosemide (LASIX) 40 MG tablet Take 1 tablet by mouth daily      levothyroxine (SYNTHROID) 100 MCG tablet Take 1 tablet by mouth Daily      isosorbide mononitrate (IMDUR) 60 MG extended release tablet Take 1 tablet by mouth daily      cloNIDine (CATAPRES) 0.3 MG/24HR PTWK APPLY 1 PATCH ON SKIN WEEKLY      montelukast (SINGULAIR) 10 MG tablet Take 1 tablet by mouth daily      aspirin 81 MG EC tablet Take by mouth daily      atorvastatin (LIPITOR) 40 MG tablet TAKE 1 TABLET BY MOUTH EVERY DAY AT BEDTIME      esomeprazole (NEXIUM) 40 MG delayed release capsule Take by mouth daily      ferrous sulfate (IRON 325) 325 (65 Fe) MG tablet TAKE 1 TABLET BY MOUTH EVERY DAY      hydrALAZINE (APRESOLINE) 100 MG tablet TAKE 1 TABLET BY MOUTH 4 TIMES DAILY      polyethyl glycol-propyl glycol 0.4-0.3 % (SYSTANE) 0.4-0.3 %

## 2024-06-17 NOTE — TELEPHONE ENCOUNTER
CLEARANCE FORM    IF THE PATIENT NEEDS A CLEARANCE APPOINTMENT WITH YOUR OFFICE PLEASE CONTACT THE PATIENT    Clearance From  Dr Barragan     Appointment Date    Time       Samuel NÉSTOR Alonso  1935  Surgeon:  Dr Hester    Procedure:  Placement of Stage 1 and Stage 2 Permanent Interstim  Date:  7/5/24  Facility: [facility].  MEDICAL HISTORY  DM CAD PVD CVA DVT/PE MI CHFMalignant Hyperthemia HTN Tobacco/ETOH Sleep Apnea GERD Hyperlipidemia Renal Insufficiency COPD/Asthma Bleeding Disorder Pacemaker/AICD  II. CURRENT MEDICATIONS: Attach list or complete     Pt is on following meds that need special instructions for surgery:  Anticoagulants Heart Meds ASA Insulin Oral anti-diabetics NSAIDS Diuretics     K replacements  III.  ALLERGIES:   IV.  FUNCTIONAL CAPACITY  >4 METS (CAN VACUUM/HOUSEWORK,CLIMB FLIGHT STAIRS WITHOUT DYSPNEA)  <4METS (FLIGHT OF STEPS CAUSES DYSPNEA/CARDIAC SYMPTOMS)   Stress Test Recommended:    Stress tests or Cardiac Cath in last 5 years:  Yes (attach report)  No   Results: WNL   ABN  Any Change in Cardiac symptoms: Yes  NO  Comments:    Revascularization in last 5 years: Yes  NO  CABG: Yes  No   Comments:     Stents:  Date: ________  Any change in cardiac symptoms  Yes  NO  Comments:   V.  REVIEW OF SYSTEMS:  (Pertinent positive or negative)    VI. PHYSICIAL EXAM  HEENT:1.  Dentations   Good Poor          HT:_______ WT:______      2.  Neck Pathology: Rheumatoid DDD C-spine  BP:______ P:________  PULMONARY:  CARDIAC  ABDOMEN  EXTREMITIES  OTHER  VII.  Testing Ordered by Surgeon  Reviewed by Clearance Physician  Test      Result  Plan,if Abnormal  ___CBS     WNL  ABN____________________  ___BMP/BUN/CR    WNL  ABN____________________  ___K+      WNL  ABN____________________  ___UA      WNL  ABN____________________  ___CXR     WNL  ABN____________________  ___EKG     WNL  ABN____________________  ___MRSA     WNL  ABN____________________  VIII.  ___Acceptable risk for surgery ___Risk

## 2024-06-17 NOTE — TELEPHONE ENCOUNTER
SURGERY SCHEDULING FORM   [address] [address]      Phone *[phone] *[phone]   Surgical Scheduling Direct Line Phone *[phone] Fax *[phone]      Samuel NÉSTOR Gupta 1935 male    [address] [place] [address] [address] [place] [id number]  Marital Status:          Home Phone: [phone]      Cell Phone:    Telephone Information:   Mobile [phone]          Surgeon: Dr. Hester       Surgery Date: 7/5/24       Time: 11:00 am    Procedure: Placement of Stage 1 &2 Interstim    Diagnosis: Dysuria, BPH with Obstruction      Important Medical History:  In Epic    Special Inst/Equip:     CPT Codes:    76850,99129, 58771  Latex Allergy: No     Cardiac Device:  No    Anesthesia:  MAC          Admission Type:  Same Day                        Admit Prior to Day of Surgery: No    Case Location:  Main OR            Preadmission Testing:  Phone Call          PAT Date and Time:______________________________________________________    PAT Confirmation #: ______________________________________________________    Post Op Visit: ___________________________________________________________    Need Preop Cardiac Clearance: Yes    Does Patient have Cardiologist/physician?     Dr Barragan    Surgery Confirmation #: __________________________________________________    : ________________________   Date: __________________________     Insurance Company Name: [employer]

## 2024-06-19 DIAGNOSIS — N30.01 ACUTE CYSTITIS WITH HEMATURIA: Primary | ICD-10-CM

## 2024-06-19 LAB
BACTERIA UR CULT: ABNORMAL
ORGANISM: ABNORMAL

## 2024-06-19 RX ORDER — CIPROFLOXACIN 250 MG/1
250 TABLET, FILM COATED ORAL 2 TIMES DAILY
Qty: 14 TABLET | Refills: 0 | Status: SHIPPED | OUTPATIENT
Start: 2024-06-19 | End: 2024-06-26

## 2024-06-26 ENCOUNTER — PREP FOR PROCEDURE (OUTPATIENT)
Dept: UROLOGY | Age: 89
End: 2024-06-26

## 2024-06-27 RX ORDER — DUTASTERIDE 0.5 MG/1
0.5 CAPSULE, LIQUID FILLED ORAL DAILY
COMMUNITY
Start: 2024-05-28

## 2024-06-27 RX ORDER — LORAZEPAM 0.5 MG/1
0.5 TABLET ORAL EVERY 12 HOURS PRN
COMMUNITY
Start: 2023-01-21

## 2024-06-27 NOTE — FLOWSHEET NOTE
Follow all instructions given by your physician  Do not eat or drink anything after midnight prior to surgery(includes water, chewing gum, mints and ice chips)  Sips of water am of surgery with allowed medications  May brush teeth   Do not smoke or chew tobacco, drink alcoholic beverages or use any illicit drugs for 24 hours prior to surgery  Bring insurance info and photo ID  Bring pertinent paperwork with you from Doctor or surgeons's office  Wear clean comfortable, loose-fitting clothing  No make-up, nail polish, jewelry, piercings, or contact lenses to be worn day of surgery  No glue on dentures morning of surgery; you will be asked to remove them for surgery. Case for glasses.  Shower the night before and the morning of surgery with cleansing soap provided or a liquid antibacterial soap, dry with new fresh clean towel after each shower, no lotions, creams or powder.  Clean sheets and pillowcase on bed night before surgery  Bring medications in original bottles, Bring rescue inhalers with you  Bring CPAP/BIPAP machine if you have one ( you may be charged if one is needed in recovery room ) no pacemaker no stimulator no     Do you have a DNR?  Bringing  it in    Please Bring Healthcare Directive or Healthcare Power of  in so we can scan it into your chart.    Our pharmacy has a Meds to Beds program where they will deliver any new prescriptions you may have to your room before you leave. Our Pharmacy will clear it through your insurance; for example (same co pay). This enables you to take your new RX as soon as you need when you get home and avoids stop/wait delays on the way home.  Please have a form of payment with you and have someone designated as your Pharmacy contact with their phone # as you may not feel well or still be under the influence of anesthesia.    Please refer to the SSI-Surgical Site Infection Flyer you hopefully received in the mail-together we can prevent infections; signs and symptoms  reviewed.  When discharged be sure you understand how to care for your wound and that you have the Dr./office phone # to call if you have any concerns or questions about your wound.     needed at discharge and someone over 18 to stay with you for 24 hours overnight (surgery may be cancelled if you don't have this)  Report to SDS on 2nd floor  If you would become ill prior to surgery, please call the surgeon  May have a visitor with you, we request that you limit to 2 visitors in pre-op area  Masks are recommended but not required, new masks at entrance desk  Call [phone] for any questions

## 2024-06-30 RX ORDER — SODIUM CHLORIDE 9 MG/ML
INJECTION, SOLUTION INTRAVENOUS PRN
Status: CANCELLED | OUTPATIENT
Start: 2024-06-30

## 2024-06-30 RX ORDER — SODIUM CHLORIDE 0.9 % (FLUSH) 0.9 %
5-40 SYRINGE (ML) INJECTION PRN
Status: CANCELLED | OUTPATIENT
Start: 2024-06-30

## 2024-06-30 RX ORDER — SODIUM CHLORIDE 0.9 % (FLUSH) 0.9 %
5-40 SYRINGE (ML) INJECTION EVERY 12 HOURS SCHEDULED
Status: CANCELLED | OUTPATIENT
Start: 2024-06-30

## 2024-07-05 ENCOUNTER — ANESTHESIA EVENT (OUTPATIENT)
Dept: OPERATING ROOM | Age: 89
End: 2024-07-05
Payer: MEDICARE

## 2024-07-05 ENCOUNTER — HOSPITAL ENCOUNTER (OUTPATIENT)
Age: 89
Setting detail: OUTPATIENT SURGERY
Discharge: HOME | End: 2024-07-05
Attending: UROLOGY | Admitting: UROLOGY
Payer: MEDICARE

## 2024-07-05 ENCOUNTER — APPOINTMENT (OUTPATIENT)
Dept: GENERAL RADIOLOGY | Age: 89
End: 2024-07-05
Attending: UROLOGY
Payer: MEDICARE

## 2024-07-05 ENCOUNTER — ANESTHESIA (OUTPATIENT)
Dept: OPERATING ROOM | Age: 89
End: 2024-07-05
Payer: MEDICARE

## 2024-07-05 VITALS
WEIGHT: 129.2 LBS | DIASTOLIC BLOOD PRESSURE: 78 MMHG | SYSTOLIC BLOOD PRESSURE: 156 MMHG | RESPIRATION RATE: 16 BRPM | BODY MASS INDEX: 20.28 KG/M2 | TEMPERATURE: 97.8 F | OXYGEN SATURATION: 95 % | HEART RATE: 90 BPM | HEIGHT: 67 IN

## 2024-07-05 LAB — INR PPP: 0.99 (ref 0.85–1.13)

## 2024-07-05 PROCEDURE — 85610 PROTHROMBIN TIME: CPT

## 2024-07-05 PROCEDURE — 2580000003 HC RX 258: Performed by: UROLOGY

## 2024-07-05 PROCEDURE — 3600000012 HC SURGERY LEVEL 2 ADDTL 15MIN: Performed by: UROLOGY

## 2024-07-05 PROCEDURE — C1778 LEAD, NEUROSTIMULATOR: HCPCS | Performed by: UROLOGY

## 2024-07-05 PROCEDURE — 6360000002 HC RX W HCPCS: Performed by: NURSE ANESTHETIST, CERTIFIED REGISTERED

## 2024-07-05 PROCEDURE — C1787 PATIENT PROGR, NEUROSTIM: HCPCS | Performed by: UROLOGY

## 2024-07-05 PROCEDURE — 7100000011 HC PHASE II RECOVERY - ADDTL 15 MIN: Performed by: UROLOGY

## 2024-07-05 PROCEDURE — 2500000003 HC RX 250 WO HCPCS: Performed by: UROLOGY

## 2024-07-05 PROCEDURE — C1897 LEAD, NEUROSTIM TEST KIT: HCPCS | Performed by: UROLOGY

## 2024-07-05 PROCEDURE — 2709999900 HC NON-CHARGEABLE SUPPLY: Performed by: UROLOGY

## 2024-07-05 PROCEDURE — 36415 COLL VENOUS BLD VENIPUNCTURE: CPT

## 2024-07-05 PROCEDURE — 3700000000 HC ANESTHESIA ATTENDED CARE: Performed by: UROLOGY

## 2024-07-05 PROCEDURE — 3700000001 HC ADD 15 MINUTES (ANESTHESIA): Performed by: UROLOGY

## 2024-07-05 PROCEDURE — 7100000010 HC PHASE II RECOVERY - FIRST 15 MIN: Performed by: UROLOGY

## 2024-07-05 PROCEDURE — 6360000002 HC RX W HCPCS: Performed by: UROLOGY

## 2024-07-05 PROCEDURE — 3600000002 HC SURGERY LEVEL 2 BASE: Performed by: UROLOGY

## 2024-07-05 PROCEDURE — C1767 GENERATOR, NEURO NON-RECHARG: HCPCS | Performed by: UROLOGY

## 2024-07-05 PROCEDURE — 2500000003 HC RX 250 WO HCPCS: Performed by: NURSE ANESTHETIST, CERTIFIED REGISTERED

## 2024-07-05 DEVICE — NEUROSTIMULATOR INTERSTIM X RECHRGE FREE TORQ WRNCH PROD: Type: IMPLANTABLE DEVICE | Status: FUNCTIONAL

## 2024-07-05 DEVICE — KIT LEAD SURE SCAN INTERSTIM MRI: Type: IMPLANTABLE DEVICE | Status: FUNCTIONAL

## 2024-07-05 RX ORDER — CEPHALEXIN 500 MG/1
500 CAPSULE ORAL 3 TIMES DAILY
Qty: 21 CAPSULE | Refills: 0 | Status: SHIPPED | OUTPATIENT
Start: 2024-07-05 | End: 2024-07-12

## 2024-07-05 RX ORDER — SODIUM CHLORIDE 0.9 % (FLUSH) 0.9 %
5-40 SYRINGE (ML) INJECTION EVERY 12 HOURS SCHEDULED
Status: DISCONTINUED | OUTPATIENT
Start: 2024-07-05 | End: 2024-07-05 | Stop reason: HOSPADM

## 2024-07-05 RX ORDER — LIDOCAINE HYDROCHLORIDE 20 MG/ML
INJECTION, SOLUTION INTRAVENOUS PRN
Status: DISCONTINUED | OUTPATIENT
Start: 2024-07-05 | End: 2024-07-05 | Stop reason: SDUPTHER

## 2024-07-05 RX ORDER — FENTANYL CITRATE 50 UG/ML
INJECTION, SOLUTION INTRAMUSCULAR; INTRAVENOUS PRN
Status: DISCONTINUED | OUTPATIENT
Start: 2024-07-05 | End: 2024-07-05 | Stop reason: SDUPTHER

## 2024-07-05 RX ORDER — PROPOFOL 10 MG/ML
INJECTION, EMULSION INTRAVENOUS CONTINUOUS PRN
Status: DISCONTINUED | OUTPATIENT
Start: 2024-07-05 | End: 2024-07-05 | Stop reason: SDUPTHER

## 2024-07-05 RX ORDER — EPHEDRINE SULFATE/0.9% NACL/PF 25 MG/5 ML
SYRINGE (ML) INTRAVENOUS PRN
Status: DISCONTINUED | OUTPATIENT
Start: 2024-07-05 | End: 2024-07-05 | Stop reason: SDUPTHER

## 2024-07-05 RX ORDER — SODIUM CHLORIDE 0.9 % (FLUSH) 0.9 %
5-40 SYRINGE (ML) INJECTION PRN
Status: DISCONTINUED | OUTPATIENT
Start: 2024-07-05 | End: 2024-07-05 | Stop reason: HOSPADM

## 2024-07-05 RX ORDER — SODIUM CHLORIDE 9 MG/ML
INJECTION, SOLUTION INTRAVENOUS PRN
Status: DISCONTINUED | OUTPATIENT
Start: 2024-07-05 | End: 2024-07-05 | Stop reason: HOSPADM

## 2024-07-05 RX ADMIN — PROPOFOL 100 MCG/KG/MIN: 10 INJECTION, EMULSION INTRAVENOUS at 10:20

## 2024-07-05 RX ADMIN — FENTANYL CITRATE 25 MCG: 50 INJECTION, SOLUTION INTRAMUSCULAR; INTRAVENOUS at 10:31

## 2024-07-05 RX ADMIN — EPHEDRINE SULFATE 15 MG: 5 INJECTION INTRAVENOUS at 10:39

## 2024-07-05 RX ADMIN — SODIUM CHLORIDE: 9 INJECTION, SOLUTION INTRAVENOUS at 11:02

## 2024-07-05 RX ADMIN — CEFEPIME 2000 MG: 2 INJECTION, POWDER, FOR SOLUTION INTRAVENOUS at 10:23

## 2024-07-05 RX ADMIN — EPHEDRINE SULFATE 10 MG: 5 INJECTION INTRAVENOUS at 10:32

## 2024-07-05 RX ADMIN — SODIUM CHLORIDE: 9 INJECTION, SOLUTION INTRAVENOUS at 10:18

## 2024-07-05 RX ADMIN — FENTANYL CITRATE 25 MCG: 50 INJECTION, SOLUTION INTRAMUSCULAR; INTRAVENOUS at 10:19

## 2024-07-05 RX ADMIN — FENTANYL CITRATE 25 MCG: 50 INJECTION, SOLUTION INTRAMUSCULAR; INTRAVENOUS at 10:39

## 2024-07-05 RX ADMIN — FENTANYL CITRATE 25 MCG: 50 INJECTION, SOLUTION INTRAMUSCULAR; INTRAVENOUS at 10:24

## 2024-07-05 RX ADMIN — LIDOCAINE HYDROCHLORIDE 75 MG: 20 INJECTION, SOLUTION INTRAVENOUS at 10:22

## 2024-07-05 ASSESSMENT — PAIN - FUNCTIONAL ASSESSMENT: PAIN_FUNCTIONAL_ASSESSMENT: NONE - DENIES PAIN

## 2024-07-05 NOTE — ANESTHESIA POSTPROCEDURE EVALUATION
Department of Anesthesiology  Postprocedure Note    Patient: Samuel Gupta  MRN: [id number]  YOB: 1935  Date of evaluation: 7/5/2024    Procedure Summary       Date: 07/05/24 Room / Location: [facility] OR 03 / [facility] OR    Anesthesia Start: 1018 Anesthesia Stop: 1107    Procedure: Placement Of Stage 1&2 Interstim (Back) Diagnosis:       Dysuria      BPH with urinary obstruction      (Dysuria [R30.0])      (BPH with urinary obstruction [N40.1, N13.8])    Surgeons: Jimbo Hester MD Responsible Provider: Wilian Diaz MD    Anesthesia Type: MAC ASA Status: 3            Anesthesia Type: No value filed.    Delia Phase I:      Delia Phase II:      Anesthesia Post Evaluation    Patient location during evaluation: bedside  Patient participation: complete - patient participated  Level of consciousness: awake and alert  Pain score: 0  Airway patency: patent  Nausea & Vomiting: no vomiting and no nausea  Cardiovascular status: blood pressure returned to baseline and hemodynamically stable  Respiratory status: acceptable, nonlabored ventilation and room air  Hydration status: stable  Pain management: satisfactory to patient        No notable events documented.

## 2024-07-05 NOTE — PROGRESS NOTES
Pt returned to \[facility]\"" room 2. Vitals and assessment as charted. 0.9 infusing, @400ml to count from PACU. Pt has crackers and water. Family at the bedside. Pt and family verbalized understanding of discharge criteria and call light use. Call light in reach.

## 2024-07-05 NOTE — DISCHARGE INSTRUCTIONS
Pt ok to discharge home in good condition  No heavy lifting, >10 lbs for today  Pt should avoid strenuous activity for today  Pt should walk moderately at home  Pt ok to shower   Pt may resume diet as tolerated  Pt should take Rx as directed  No driving while on narcotics  Please call attending physician or hospital  with questions  Call or Present to ED if fever (> 101F), intractable nausea vomiting or pain.  Rx in chart    Pt should follow up with CORDELL FREEMAN MD, in 2 weeks, call to confirm appointment      Restart blood thinners in 72 hours

## 2024-07-05 NOTE — H&P
JIMBO HESTER MD  History and Physical    Patient:  Samuel Gupta  MRN: [id number]  YOB: 1935    HISTORY OF PRESENT ILLNESS:     The patient is a 89 y.o. male who presents with oab. Here for procedure.    Patient's old records, notes and chart reviewed and summarized above.     JIMBO HESTER MD independently reviewed the images and verified the radiology reports from:    No results found.      Past Medical History:    Past Medical History:   Diagnosis Date    Acute hemorrhagic cystitis     JACKI (acute kidney injury) (HCC)     Aortic stenosis     Cancer (HCC)     colon    Chronic diastolic heart failure (HCC)     CKD (chronic kidney disease)     stage 4    Esophageal reflux     High cholesterol     History of gross hematuria     History of tobacco use     History of urinary retention     Hypertension     Hypertrophy of prostate without urinary obstruction and other lower urinary tract symptoms (LUTS)     Hypothyroid     Incomplete bladder emptying     Nocturia     4-5x a night    PAF (paroxysmal atrial fibrillation) (HCC)     PVD (peripheral vascular disease) (HCC)     Reflux     Urinary tract infection, site not specified        Past Surgical History:    Past Surgical History:   Procedure Laterality Date    AORTIC VALVE REPLACEMENT  2020    CARDIAC CATHETERIZATION      CATARACT REMOVAL Bilateral     COLECTOMY      COLONOSCOPY  11/15/2013    HAND SURGERY Left     pin finger    HERNIA REPAIR      LUMBAR LAMINECTOMY  1970    OTHER SURGICAL HISTORY  06/08/2020    Thoracic aortic aneurysm repair with aortic root replacement (N/A) Procedure: REPLACEMENT, AORTIC ROOT AND ASCENDING AORTA, WITH CARDIOPULMONARY BYPASS;  Surgeon: Lawrence Springer MD    TOTAL KNEE ARTHROPLASTY Bilateral     TURP      TURP N/A 2/6/2024    Cystoscopy Greenlight Photovaporization of Prostate performed by Jimbo Hester MD at [facility] OR    UROLOGICAL SURGERY  09/21/2021    Transurethral resection of bladder tumor (N/A)  Automatic Reconciliation, Ar   esomeprazole (NEXIUM) 40 MG delayed release capsule Take by mouth daily    Automatic Reconciliation, Ar   ferrous sulfate (IRON 325) 325 (65 Fe) MG tablet TAKE 1 TABLET BY MOUTH EVERY DAY 21   Automatic Reconciliation, Ar   hydrALAZINE (APRESOLINE) 100 MG tablet TAKE 1 TABLET BY MOUTH 4 TIMES DAILY 21   Automatic Reconciliation, Ar   polyethyl glycol-propyl glycol 0.4-0.3 % (SYSTANE) 0.4-0.3 % ophthalmic solution Place into both eyes as needed for Dry Eyes ceived the following from [facility]: Outside name: peg 400-propylene glycol (Systane, propylene glycoL,) 0.4-0.3 % drop    Automatic Reconciliation, Ar       Allergies:  Iodinated contrast media, Morphine, and Shellfish-derived products    Social History:    Social History     Socioeconomic History    Marital status:      Spouse name: Not on file    Number of children: Not on file    Years of education: Not on file    Highest education level: Not on file   Occupational History    Not on file   Tobacco Use    Smoking status: Former     Current packs/day: 0.00     Types: Cigarettes     Quit date: 1985     Years since quittin.5    Smokeless tobacco: Never   Vaping Use    Vaping Use: Never used   Substance and Sexual Activity    Alcohol use: Yes     Alcohol/week: 2.0 standard drinks of alcohol     Types: 1 Shots of liquor, 1 Drinks containing 0.5 oz of alcohol per week     Comment: once a week    Drug use: Not on file    Sexual activity: Not Currently   Other Topics Concern    Not on file   Social History Narrative    Not on file     Social Determinants of Health     Financial Resource Strain: Not on file   Food Insecurity: Not on file   Transportation Needs: Not on file   Physical Activity: Not on file   Stress: Not on file   Social Connections: Patient Unable To Answer (2024)    Social Connections ([facility] HRSN)     If for any reason you need help with day-to-day activities such as bathing,

## 2024-07-05 NOTE — BRIEF OP NOTE
Brief Postoperative Note      Patient: Samuel Gupta  YOB: 1935  MRN: [id number]    Date of Procedure: 7/5/2024    Pre-Op Diagnosis Codes:     * Dysuria [R30.0]     * BPH with urinary obstruction [N40.1, N13.8]    Post-Op Diagnosis: Same       Procedure(s):  Placement Of Stage 1&2 Interstim    Surgeon(s):  Jimbo Hester MD    Assistant:  * No surgical staff found *    Anesthesia: Monitor Anesthesia Care    Estimated Blood Loss (mL): Minimal    Complications: None    Specimens:   * No specimens in log *    Implants:  * No implants in log *      Drains: * No LDAs found *    Findings:  2 weeks with ernst for check    Electronically signed by JIMBO HESTER MD on 7/5/2024 at 10:24 AM

## 2024-07-05 NOTE — ANESTHESIA PRE PROCEDURE
Department of Anesthesiology  Preprocedure Note       Name:  Samuel Gupta   Age:  89 y.o.  :  1935                                          MRN:  [id number]         Date:  2024      Surgeon: Surgeon(s):  Jimbo Hester MD    Procedure: Procedure(s):  Placement Of Stage 1&2 Interstim    Medications prior to admission:   Prior to Admission medications    Medication Sig Start Date End Date Taking? Authorizing Provider   dutasteride (AVODART) 0.5 MG capsule Take 1 capsule by mouth daily 24  Yes Byron Hilton MD   LORazepam (ATIVAN) 0.5 MG tablet Take 1 tablet by mouth every 12 hours as needed for Anxiety. 23  Yes Bryon Hilton MD   predniSONE (DELTASONE) 10 MG tablet  24   Byron Hilton MD   furosemide (LASIX) 40 MG tablet Take 1 tablet by mouth daily    Byron Hilton MD   levothyroxine (SYNTHROID) 100 MCG tablet Take 88 mcg by mouth Daily    Byron Hilton MD   isosorbide mononitrate (IMDUR) 60 MG extended release tablet Take 1 tablet by mouth daily 23   Byron Hilton MD   cloNIDine (CATAPRES) 0.3 MG/24HR PTWK Place 1 patch onto the skin once a week Roger 6/15/23   Byron Hilton MD   Multiple Vitamins-Minerals (PRESERVISION AREDS PO) Take by mouth daily    Byron Hilton MD   montelukast (SINGULAIR) 10 MG tablet Take 1 tablet by mouth daily 23   Byron Hilton MD   escitalopram (LEXAPRO) 10 MG tablet Take 1 tablet by mouth daily 7/10/23   Byron Hilton MD   mirtazapine (REMERON) 7.5 MG tablet Take 1 tablet by mouth nightly at bedtime. 6/15/23   Byron Hilton MD   albuterol sulfate HFA (PROVENTIL;VENTOLIN;PROAIR) 108 (90 Base) MCG/ACT inhaler PLEASE SEE ATTACHED FOR DETAILED DIRECTIONS  Patient not taking: Reported on 2024   Byron Hilton MD   aspirin 81 MG EC tablet Take by mouth daily    Automatic Reconciliation, Ar   atorvastatin (LIPITOR) 40 MG tablet TAKE 1 TABLET BY MOUTH

## 2024-07-07 NOTE — OP NOTE
Jimbo Hester MD.  Urologic Surgery      [facility]    DATE: 7/5/2024  Patient:  Samuel Gupta  MRN: [id number]  YOB: 1935    SURGEON: Jimbo Hester MD.    ASSISTANT: none    PREOPERATIVE DIAGNOSIS: OAB with Urge Urinary Incontinence    POSTOPERATIVE DIAGNOSIS: OAB with Urge Urinary Incontinence    PROCEDURE PERFORMED: Sacral Neuromodulation Implantation of Interstim     ANESTHESIA: Monitor Anesthesia Care    COMPLICATIONS: none    OR BLOOD LOSS:  Minimal    FLUIDS: Cystalloids per Anesthesia    SPECIMENS:  * No specimens in log *      DRAINS: none    INDICATIONS FOR PROCEDURE:  The patient is a 89 y.o. male who presents today with Dysuria [R30.0]  BPH with urinary obstruction [N40.1, N13.8] here for Placement Of Stage 1&2 Interstim. After risks, benefits and alternatives of the procedure were discussed with the patient, the patient elected to proceed.     DETAILS OF PROCEDURE:  The patient was properly identified and placed in prone position. Pillows were placed under lower abdomen to  flatten sacrum and under shins to allow the toes to dangle freely. A ground pad was placed on the bottom of the patient’s foot and the long test stimulation cable was connected to the ground pad and the external test stimulator. The patient was prepped and draped in usual sterile fashion. A timeout occurred in which two patient identifiers were used.     The sciatic notches and sacral midline were identified using fluroscopy. Local injection was administered around the atticipated foramen needle entry point which was 2cm lateral to the sacral midline and 2cm cephalad of sciatic notch level.    A foramen needle was introduced at an approximate 60 degree angle, feeling for the foraminal margins until S3 was identified. Proper needle position was confirmed by stimulating the needle and observing a “bellowing” response, and also plantar flexion of the big toe. In addition, fluoroscopic images demonstrated  appropriate placement. The foramen needle stylet was removed and a directional guide was placed through the needle. The foramen needle was removed by sliding it over the directional guide. A small incision was made next to the directional guide through the skin with a 15-blade knife. The lead introducer with dilator was placed over the directional guide. Utilizing fluoroscopic guidance the lead introducer was advanced until the radiopaque brian was half-way through the foramen. The dilator was removed along with the directional guide.     Using fluoroscopy, the tined lead with the angled stylet was placed through the introducer until electrodes two and three straddled the anterior edge of the sacrum. All four electrodes were tested, observing “mary anne” and plantar flexion of the great toe.     All 4 electrodes demonstrated excellent responses.     After satisfactory lead positioning was confirmed, the introducer was retracted over the lead under continuous fluoroscopy, deploying the tines into the presacral tissues. Re-testing after removal of the introducer re-confirmed the aforementioned responses.    The internal neurostimulator pocket site was identified on the patient's left side below the iliac crest and lateral to the sacrum. Local was administered and an incision was made into the subcutaneous tissue creating a connection site. Blunt dissection was used to create a small pocket with hemostasis achieved. We placed the interstim device.    The incisions and pocket were irrigated and closed. The dermis was re-approximated with 2-0 Vicryl. The skin was closed with a 4-0 Monocryl in a subcuticular manner. The percutaneous extension was attached to the external gray twist-lock cable. The patient was awakened and transferred to the PACU in good and stable condition.    In the PACU, using the external test stimulator, the patient was programmed to the electrode of optimum sensation and provided utilization

## 2024-07-15 ENCOUNTER — OFFICE VISIT (OUTPATIENT)
Dept: UROLOGY | Age: 89
End: 2024-07-15
Payer: MEDICARE

## 2024-07-15 VITALS — HEIGHT: 67 IN | BODY MASS INDEX: 20.09 KG/M2 | WEIGHT: 128 LBS | RESPIRATION RATE: 16 BRPM

## 2024-07-15 DIAGNOSIS — N40.1 BENIGN PROSTATIC HYPERPLASIA WITH URINARY OBSTRUCTION: ICD-10-CM

## 2024-07-15 DIAGNOSIS — R30.0 DYSURIA: Primary | ICD-10-CM

## 2024-07-15 DIAGNOSIS — N13.8 BENIGN PROSTATIC HYPERPLASIA WITH URINARY OBSTRUCTION: ICD-10-CM

## 2024-07-15 DIAGNOSIS — N32.81 OAB (OVERACTIVE BLADDER): ICD-10-CM

## 2024-07-15 PROCEDURE — G8420 CALC BMI NORM PARAMETERS: HCPCS

## 2024-07-15 PROCEDURE — G8427 DOCREV CUR MEDS BY ELIG CLIN: HCPCS

## 2024-07-15 PROCEDURE — 1123F ACP DISCUSS/DSCN MKR DOCD: CPT

## 2024-07-15 PROCEDURE — 1036F TOBACCO NON-USER: CPT

## 2024-07-15 PROCEDURE — 99213 OFFICE O/P EST LOW 20 MIN: CPT

## 2024-07-15 NOTE — PATIENT INSTRUCTIONS
Samuel removed in the office. Patient has mobilized and is well appearing. Voiding well prior to surgery.

## 2024-07-15 NOTE — PROGRESS NOTES
Samuel catheter removed as patient's cognitive status and mobility have greatly improved.    2.   BPH w/ LUTS  - S/P Cystoscopy, Greenlight PVP in February. Voiding effectively prior to InterStim Stage I/II.  - Off Avodart and Flomax.    *Follow-up in 3 months with Dr. Hester for symptom check.    Nitin Castillo PA-C  Urology

## 2024-07-19 ENCOUNTER — LAB (OUTPATIENT)
Age: 89
End: 2024-07-19

## 2024-07-19 LAB
ALBUMIN SERPL BCG-MCNC: 4.1 G/DL (ref 3.5–5.1)
ALP SERPL-CCNC: 62 U/L (ref 38–126)
ALT SERPL W/O P-5'-P-CCNC: 19 U/L (ref 11–66)
ANION GAP SERPL CALC-SCNC: 15 MEQ/L (ref 8–16)
AST SERPL-CCNC: 28 U/L (ref 5–40)
BACTERIA: ABNORMAL
BASOPHILS ABSOLUTE: 0.1 THOU/MM3 (ref 0–0.1)
BASOPHILS NFR BLD AUTO: 0.7 %
BILIRUB SERPL-MCNC: 0.5 MG/DL (ref 0.3–1.2)
BILIRUB UR QL STRIP: NEGATIVE
BUN SERPL-MCNC: 50 MG/DL (ref 7–22)
CALCIUM SERPL-MCNC: 9.5 MG/DL (ref 8.5–10.5)
CASTS #/AREA URNS LPF: ABNORMAL /LPF
CASTS #/AREA URNS LPF: ABNORMAL /LPF
CHARACTER UR: CLEAR
CHARCOAL URNS QL MICRO: ABNORMAL
CHLORIDE SERPL-SCNC: 105 MEQ/L (ref 98–111)
CO2 SERPL-SCNC: 25 MEQ/L (ref 23–33)
COLOR UR: YELLOW
CREAT SERPL-MCNC: 2.2 MG/DL (ref 0.4–1.2)
CRYSTALS URNS QL MICRO: ABNORMAL
DEPRECATED RDW RBC AUTO: 53.2 FL (ref 35–45)
EOSINOPHIL NFR BLD AUTO: 3.1 %
EOSINOPHILS ABSOLUTE: 0.2 THOU/MM3 (ref 0–0.4)
EPITHELIAL CELLS, UA: ABNORMAL /HPF
ERYTHROCYTE [DISTWIDTH] IN BLOOD BY AUTOMATED COUNT: 14.9 % (ref 11.5–14.5)
GFR SERPL CREATININE-BSD FRML MDRD: 28 ML/MIN/1.73M2
GLUCOSE SERPL-MCNC: 132 MG/DL (ref 70–108)
GLUCOSE UR QL STRIP.AUTO: NEGATIVE MG/DL
HCT VFR BLD AUTO: 35.4 % (ref 42–52)
HGB BLD-MCNC: 11 GM/DL (ref 14–18)
HGB UR QL STRIP.AUTO: NEGATIVE
IMM GRANULOCYTES # BLD AUTO: 0.03 THOU/MM3 (ref 0–0.07)
IMM GRANULOCYTES NFR BLD AUTO: 0.4 %
KETONES UR QL STRIP.AUTO: NEGATIVE
LEUKOCYTE ESTERASE UR QL STRIP.AUTO: ABNORMAL
LYMPHOCYTES ABSOLUTE: 1.3 THOU/MM3 (ref 1–4.8)
LYMPHOCYTES NFR BLD AUTO: 18 %
MCH RBC QN AUTO: 30.5 PG (ref 26–33)
MCHC RBC AUTO-ENTMCNC: 31.1 GM/DL (ref 32.2–35.5)
MCV RBC AUTO: 98.1 FL (ref 80–94)
MONOCYTES ABSOLUTE: 0.5 THOU/MM3 (ref 0.4–1.3)
MONOCYTES NFR BLD AUTO: 7.6 %
NEUTROPHILS ABSOLUTE: 5.1 THOU/MM3 (ref 1.8–7.7)
NEUTROPHILS NFR BLD AUTO: 70.2 %
NITRITE UR QL STRIP.AUTO: NEGATIVE
NRBC BLD AUTO-RTO: 0 /100 WBC
PH UR STRIP.AUTO: 6.5 [PH] (ref 5–9)
PLATELET # BLD AUTO: 346 THOU/MM3 (ref 130–400)
PMV BLD AUTO: 10.4 FL (ref 9.4–12.4)
POTASSIUM SERPL-SCNC: 4.4 MEQ/L (ref 3.5–5.2)
PROT SERPL-MCNC: 7 G/DL (ref 6.1–8)
PROT UR STRIP.AUTO-MCNC: 100 MG/DL
RBC # BLD AUTO: 3.61 MILL/MM3 (ref 4.7–6.1)
RBC #/AREA URNS HPF: ABNORMAL /HPF
RENAL EPI CELLS #/AREA URNS HPF: ABNORMAL /[HPF]
SODIUM SERPL-SCNC: 145 MEQ/L (ref 135–145)
SPECIFIC GRAVITY UA: 1.02 (ref 1–1.03)
T4 FREE SERPL-MCNC: 1.36 NG/DL (ref 0.93–1.68)
TSH SERPL DL<=0.005 MIU/L-ACNC: 6.01 UIU/ML (ref 0.4–4.2)
UROBILINOGEN, URINE: 0.2 EU/DL (ref 0–1)
WBC # BLD AUTO: 7.2 THOU/MM3 (ref 4.8–10.8)
WBC #/AREA URNS HPF: ABNORMAL /HPF
YEAST LIKE FUNGI URNS QL MICRO: ABNORMAL

## 2024-07-20 LAB
RPR SER QL: NONREACTIVE
VIT B12 SERPL-MCNC: 601 PG/ML (ref 211–911)

## 2024-10-15 ENCOUNTER — OFFICE VISIT (OUTPATIENT)
Dept: UROLOGY | Age: 89
End: 2024-10-15

## 2024-10-15 DIAGNOSIS — R33.9 INCOMPLETE BLADDER EMPTYING: ICD-10-CM

## 2024-10-15 DIAGNOSIS — N40.1 BENIGN PROSTATIC HYPERPLASIA WITH URINARY FREQUENCY: Primary | ICD-10-CM

## 2024-10-15 DIAGNOSIS — R35.0 BENIGN PROSTATIC HYPERPLASIA WITH URINARY FREQUENCY: Primary | ICD-10-CM

## 2024-10-15 DIAGNOSIS — N32.81 OAB (OVERACTIVE BLADDER): ICD-10-CM

## 2024-10-15 LAB
BILIRUBIN, URINE: NEGATIVE
BLOOD URINE, POC: NEGATIVE
CHARACTER, URINE: CLEAR
COLOR, UA: YELLOW
GLUCOSE URINE: NEGATIVE MG/DL
KETONES, URINE: NEGATIVE
LEUKOCYTE CLUMPS, URINE: NEGATIVE
NITRITE, URINE: NEGATIVE
PH, URINE: 7 (ref 5–9)
POST VOID RESIDUAL (PVR): 417 ML
PROTEIN, URINE: NEGATIVE MG/DL
SPECIFIC GRAVITY UA: 1.01 (ref 1–1.03)
UROBILINOGEN, URINE: 0.2 EU/DL (ref 0–1)

## 2024-10-15 RX ORDER — TAMSULOSIN HYDROCHLORIDE 0.4 MG/1
0.4 CAPSULE ORAL DAILY
Qty: 90 CAPSULE | Refills: 3 | Status: SHIPPED | OUTPATIENT
Start: 2024-10-15

## 2024-10-15 RX ORDER — DUTASTERIDE 0.5 MG/1
0.5 CAPSULE, LIQUID FILLED ORAL DAILY
Qty: 90 CAPSULE | Refills: 3 | Status: SHIPPED | OUTPATIENT
Start: 2024-10-15

## 2024-10-15 NOTE — PROGRESS NOTES
Per patient medication list is the same as previous reviewed.   
OTHER SURGICAL HISTORY  2020    Thoracic aortic aneurysm repair with aortic root replacement (N/A) Procedure: REPLACEMENT, AORTIC ROOT AND ASCENDING AORTA, WITH CARDIOPULMONARY BYPASS;  Surgeon: Lawrence Springer MD    STIMULATOR SURGERY N/A 2024    Placement Of Stage 1&2 Interstim performed by Jimbo Hester MD at [facility] OR    TOTAL KNEE ARTHROPLASTY Bilateral     TURP      TURP N/A 2024    Cystoscopy Greenlight Photovaporization of Prostate performed by Jimbo Hester MD at [facility] OR    UROLOGICAL SURGERY  2021    Transurethral resection of bladder tumor (N/A) Procedure: CYSTOSCOPY, WITH FULGURATION, BLADDER BIOPSY, AND RETROGRADE PYELOGRAM;  Surgeon: Anthony Covarrubias MD    UROLOGICAL SURGERY  2021    Cystoscopy, clot evacuation, fulguration w/ TURP (transurethral resection of prostate). Dr. Lionel Scott     Family History   Problem Relation Age of Onset    Cancer Mother     Cancer Sister     Cancer Brother     Diabetes Neg Hx     Heart Disease Neg Hx     High Blood Pressure Neg Hx     Stroke Neg Hx      No outpatient medications have been marked as taking for the 10/15/24 encounter (Office Visit) with Nitin Castillo PA-C.       Iodinated contrast media, Morphine, and Shellfish-derived products  Social History     Tobacco Use   Smoking Status Former    Current packs/day: 0.00    Types: Cigarettes    Quit date: 1985    Years since quittin.8   Smokeless Tobacco Never       Social History     Substance and Sexual Activity   Alcohol Use Yes    Alcohol/week: 2.0 standard drinks of alcohol    Types: 1 Shots of liquor, 1 Drinks containing 0.5 oz of alcohol per week    Comment: once a week       REVIEW OF SYSTEMS:  Constitutional: negative  Eyes: negative  Respiratory: negative  Cardiovascular: negative  Gastrointestinal: negative  Musculoskeletal: negative  Genitourinary: negative except for what is in HPI  Skin: negative   Neurological: negative  Hematological/Lymphatic:

## 2024-10-15 NOTE — PATIENT INSTRUCTIONS
Restart Flomax and Avodart once daily. Take Flomax at nighttime.    Double void. Empty as best you can, wait 60 seconds then try to urinate again.

## 2024-11-05 ENCOUNTER — OFFICE VISIT (OUTPATIENT)
Dept: UROLOGY | Age: 89
End: 2024-11-05
Payer: MEDICARE

## 2024-11-05 VITALS — HEIGHT: 67 IN | BODY MASS INDEX: 22.16 KG/M2 | WEIGHT: 141.2 LBS | RESPIRATION RATE: 18 BRPM

## 2024-11-05 DIAGNOSIS — R33.9 INCOMPLETE BLADDER EMPTYING: ICD-10-CM

## 2024-11-05 DIAGNOSIS — N32.81 OAB (OVERACTIVE BLADDER): ICD-10-CM

## 2024-11-05 DIAGNOSIS — R35.0 BENIGN PROSTATIC HYPERPLASIA WITH URINARY FREQUENCY: Primary | ICD-10-CM

## 2024-11-05 DIAGNOSIS — N40.1 BENIGN PROSTATIC HYPERPLASIA WITH URINARY FREQUENCY: Primary | ICD-10-CM

## 2024-11-05 LAB
BILIRUBIN, URINE: NEGATIVE
BLOOD URINE, POC: NEGATIVE
CHARACTER, URINE: CLEAR
COLOR, UA: YELLOW
GLUCOSE URINE: NEGATIVE MG/DL
KETONES, URINE: NEGATIVE
LEUKOCYTE CLUMPS, URINE: ABNORMAL
NITRITE, URINE: NEGATIVE
PH, URINE: 5.5 (ref 5–9)
POST VOID RESIDUAL (PVR): 208 ML
PROTEIN, URINE: 30 MG/DL
SPECIFIC GRAVITY UA: 1.01 (ref 1–1.03)
UROBILINOGEN, URINE: 0.2 EU/DL (ref 0–1)

## 2024-11-05 PROCEDURE — 81003 URINALYSIS AUTO W/O SCOPE: CPT

## 2024-11-05 PROCEDURE — 51798 US URINE CAPACITY MEASURE: CPT

## 2024-11-05 NOTE — PROGRESS NOTES
[facility] [place] [facility]  [facility] UROLOGY  [address]  [address]  [facility] [address]  Dept: [phone]  Loc: [phone]  Visit Date: 11/5/2024      HOLLEY Gupta is a 89 y.o. male that presents to the urology clinic for post-op check.    S/P Greenlight PVP by Dr. Hester in February 2024 followed by Placement of InterStim Device (Stage I/II) by Dr. Hester on 07/05/24. Patient did have relief in both his OAB and BPH symptoms for ~2 months, but over the course of the last 30-40 days his OAB symptoms and UUI + incomplete bladder emptying have worsened.    PVR of 417 mL at last visit, improved to 208 mL this visit. Patient denies suprapubic pressure or flank pain.    Nocturia x 3. Weakening urinary stream and postvoid dribbling.      Results for orders placed or performed in visit on 11/05/24   poct post void residual   Result Value Ref Range    post void residual 208 ml         Last BUN and Creatinine:  Lab Results   Component Value Date    BUN 50 (H) 07/19/2024     Lab Results   Component Value Date    CREATININE 2.2 (H) 07/19/2024         PAST MEDICAL, FAMILY AND SOCIAL HISTORY UPDATE:  Past Medical History:   Diagnosis Date    Acute hemorrhagic cystitis     JACKI (acute kidney injury) ([facility])     Aortic stenosis     Cancer (Formerly [facility])     colon    Chronic diastolic heart failure ([facility]ly [facility])     CKD (chronic kidney disease)     stage 4    Esophageal reflux     High cholesterol     History of gross hematuria     History of tobacco use     History of urinary retention     Hypertension     Hypertrophy of prostate without urinary obstruction and other lower urinary tract symptoms (LUTS)     Hypothyroid     Incomplete bladder emptying     Nocturia     4-5x a night    PAF (paroxysmal atrial fibrillation) (Former[facility])     PVD (peripheral vascular disease) ([facility])     Reflux     Urinary tract infection, site not specified      Past Surgical History:   Procedure Laterality Date    AORTIC VALVE REPLACEMENT

## 2024-11-13 ENCOUNTER — NURSE ONLY (OUTPATIENT)
Dept: UROLOGY | Age: 89
End: 2024-11-13

## 2024-11-13 DIAGNOSIS — R32 URINARY INCONTINENCE, UNSPECIFIED TYPE: ICD-10-CM

## 2024-11-13 DIAGNOSIS — N32.81 OAB (OVERACTIVE BLADDER): Primary | ICD-10-CM

## 2024-11-13 PROCEDURE — NBSRV NON-BILLABLE SERVICE: Performed by: NURSE PRACTITIONER

## 2024-11-13 NOTE — PROGRESS NOTES
DIAGNOSIS/INDICATION:  oab/urinary incontinence    Pt saw bettye today for Interstim rep visit. See scanned Interstim Office Note under Media tab in chart.

## 2024-11-27 ENCOUNTER — OFFICE VISIT (OUTPATIENT)
Dept: UROLOGY | Age: 89
End: 2024-11-27

## 2024-11-27 VITALS — RESPIRATION RATE: 16 BRPM | HEIGHT: 67 IN | BODY MASS INDEX: 22.74 KG/M2 | WEIGHT: 144.9 LBS

## 2024-11-27 DIAGNOSIS — N40.1 BENIGN PROSTATIC HYPERPLASIA WITH URINARY FREQUENCY: Primary | ICD-10-CM

## 2024-11-27 DIAGNOSIS — R33.9 INCOMPLETE BLADDER EMPTYING: ICD-10-CM

## 2024-11-27 DIAGNOSIS — N32.81 OAB (OVERACTIVE BLADDER): ICD-10-CM

## 2024-11-27 DIAGNOSIS — R35.0 BENIGN PROSTATIC HYPERPLASIA WITH URINARY FREQUENCY: Primary | ICD-10-CM

## 2024-11-27 LAB
BILIRUBIN, URINE: NEGATIVE
BLOOD URINE, POC: NEGATIVE
CHARACTER, URINE: CLEAR
COLOR, UA: YELLOW
GLUCOSE URINE: NEGATIVE MG/DL
KETONES, URINE: NEGATIVE
LEUKOCYTE CLUMPS, URINE: NEGATIVE
NITRITE, URINE: NEGATIVE
PH, URINE: 7 (ref 5–9)
POST VOID RESIDUAL (PVR): 78 ML
PROTEIN, URINE: 100 MG/DL
SPECIFIC GRAVITY UA: 1.01 (ref 1–1.03)
UROBILINOGEN, URINE: 0.2 EU/DL (ref 0–1)

## 2024-11-27 NOTE — PROGRESS NOTES
male in no acute distress and alert and oriented to person, place and time.    Pulmonary: Non-labored respiration.  Cardiovascular: Normal rate, regular rhythm, normal peripheral pulses.  Skin: Warm and dry.  Psych: Normal mood and affect.  Genitourinary: Bladder non-distended and non-tender.      Assessment and Plan   OAB  - S/P Placement of InterStim Device (Stage I/II) by Dr. Hester on 07/05/24.  - Settings adjusted by [employer] and patient reports improvement in urgency and nocturia.    2.   BPH w/ LUTS  - S/P Cystoscopy, Greenlight PVP in February (2024).  - Emptying much more effectively. PVR of 78 mL in the office which is improved since his last visit of 208 mL.  - Continue Avodart and Flomax.    *Follow-up in 6 months for symptom check.    Nitin Castillo PA-C  Urology

## 2024-12-02 ENCOUNTER — LAB (OUTPATIENT)
Age: 89
End: 2024-12-02

## 2024-12-02 DIAGNOSIS — N28.1 RENAL CYST: ICD-10-CM

## 2024-12-02 DIAGNOSIS — N18.4 CKD (CHRONIC KIDNEY DISEASE), STAGE IV (HCC): ICD-10-CM

## 2024-12-02 DIAGNOSIS — I12.9 HYPERTENSIVE RENAL DISEASE, STAGE 1 THROUGH STAGE 4 OR UNSPECIFIED CHRONIC KIDNEY DISEASE: ICD-10-CM

## 2024-12-02 LAB
ANION GAP SERPL CALC-SCNC: 14 MEQ/L (ref 8–16)
BUN SERPL-MCNC: 37 MG/DL (ref 7–22)
CALCIUM SERPL-MCNC: 9.8 MG/DL (ref 8.5–10.5)
CHLORIDE SERPL-SCNC: 105 MEQ/L (ref 98–111)
CO2 SERPL-SCNC: 27 MEQ/L (ref 23–33)
CREAT SERPL-MCNC: 2.3 MG/DL (ref 0.4–1.2)
GFR SERPL CREATININE-BSD FRML MDRD: 27 ML/MIN/1.73M2
GLUCOSE SERPL-MCNC: 95 MG/DL (ref 70–108)
HCT VFR BLD AUTO: 33.9 % (ref 42–52)
HGB BLD-MCNC: 10.6 GM/DL (ref 14–18)
PHOSPHATE SERPL-MCNC: 3.5 MG/DL (ref 2.4–4.7)
POTASSIUM SERPL-SCNC: 4.1 MEQ/L (ref 3.5–5.2)
PTH-INTACT SERPL-MCNC: 55.2 PG/ML (ref 15–65)
SODIUM SERPL-SCNC: 146 MEQ/L (ref 135–145)

## 2024-12-05 ENCOUNTER — OFFICE VISIT (OUTPATIENT)
Dept: NEPHROLOGY | Age: 89
End: 2024-12-05
Payer: MEDICARE

## 2024-12-05 VITALS
SYSTOLIC BLOOD PRESSURE: 140 MMHG | BODY MASS INDEX: 22.6 KG/M2 | HEIGHT: 67 IN | DIASTOLIC BLOOD PRESSURE: 65 MMHG | HEART RATE: 62 BPM | OXYGEN SATURATION: 99 % | WEIGHT: 144 LBS

## 2024-12-05 DIAGNOSIS — N18.4 CKD (CHRONIC KIDNEY DISEASE), STAGE IV (HCC): Primary | ICD-10-CM

## 2024-12-05 DIAGNOSIS — I12.9 HYPERTENSIVE RENAL DISEASE, STAGE 1 THROUGH STAGE 4 OR UNSPECIFIED CHRONIC KIDNEY DISEASE: ICD-10-CM

## 2024-12-05 DIAGNOSIS — N28.1 RENAL CYST: ICD-10-CM

## 2024-12-05 PROCEDURE — 1159F MED LIST DOCD IN RCRD: CPT | Performed by: INTERNAL MEDICINE

## 2024-12-05 PROCEDURE — G8482 FLU IMMUNIZE ORDER/ADMIN: HCPCS | Performed by: INTERNAL MEDICINE

## 2024-12-05 PROCEDURE — 1123F ACP DISCUSS/DSCN MKR DOCD: CPT | Performed by: INTERNAL MEDICINE

## 2024-12-05 PROCEDURE — 99213 OFFICE O/P EST LOW 20 MIN: CPT | Performed by: INTERNAL MEDICINE

## 2024-12-05 PROCEDURE — G8420 CALC BMI NORM PARAMETERS: HCPCS | Performed by: INTERNAL MEDICINE

## 2024-12-05 PROCEDURE — G8427 DOCREV CUR MEDS BY ELIG CLIN: HCPCS | Performed by: INTERNAL MEDICINE

## 2024-12-05 PROCEDURE — 1036F TOBACCO NON-USER: CPT | Performed by: INTERNAL MEDICINE

## 2024-12-05 NOTE — PROGRESS NOTES
2020    CARDIAC CATHETERIZATION      CATARACT REMOVAL Bilateral     COLECTOMY      COLONOSCOPY  11/15/2013    HAND SURGERY Left     pin finger    HERNIA REPAIR      LUMBAR LAMINECTOMY  1970    OTHER SURGICAL HISTORY  06/08/2020    Thoracic aortic aneurysm repair with aortic root replacement (N/A) Procedure: REPLACEMENT, AORTIC ROOT AND ASCENDING AORTA, WITH CARDIOPULMONARY BYPASS;  Surgeon: Lawrence Springer MD    STIMULATOR SURGERY N/A 7/5/2024    Placement Of Stage 1&2 Interstim performed by Jimbo Hester MD at [facility] OR    TOTAL KNEE ARTHROPLASTY Bilateral     TURP      TURP N/A 2/6/2024    Cystoscopy Greenlight Photovaporization of Prostate performed by Jimbo Hester MD at [facility] OR    UROLOGICAL SURGERY  09/21/2021    Transurethral resection of bladder tumor (N/A) Procedure: CYSTOSCOPY, WITH FULGURATION, BLADDER BIOPSY, AND RETROGRADE PYELOGRAM;  Surgeon: Anthony Covarrubias MD    UROLOGICAL SURGERY  09/25/2021    Cystoscopy, clot evacuation, fulguration w/ TURP (transurethral resection of prostate). Dr. Lionel Scott        VITALS:  BP (!) 140/65 (Site: Right Upper Arm, Position: Sitting, Cuff Size: Medium Adult)   Pulse 62   Ht 1.702 m (5' 7\")   Wt 65.3 kg (144 lb)   SpO2 99%   BMI 22.55 kg/m²   Wt Readings from Last 3 Encounters:   12/05/24 65.3 kg (144 lb)   11/27/24 65.7 kg (144 lb 14.4 oz)   11/05/24 64 kg (141 lb 3.2 oz)     Body mass index is 22.55 kg/m².     General Appearance: alert and cooperative with exam, appears comfortable, no distress  Lungs: Air entry B/L, no crackles or rales, no use of accessory muscles  Heart: S1, S2 heard  GI: soft, non-tender, no guarding  Extremities: No sig LE edema     Medications:  Current Outpatient Medications   Medication Sig Dispense Refill    tamsulosin (FLOMAX) 0.4 MG capsule Take 1 capsule by mouth daily 90 capsule 3    dutasteride (AVODART) 0.5 MG capsule Take 1 capsule by mouth daily 90 capsule 3    dutasteride (AVODART) 0.5 MG capsule Take 1 capsule by mouth

## 2024-12-12 ENCOUNTER — LAB (OUTPATIENT)
Age: 89
End: 2024-12-12

## 2024-12-12 LAB
ALBUMIN SERPL BCG-MCNC: 3.7 G/DL (ref 3.5–5.1)
ALP SERPL-CCNC: 59 U/L (ref 38–126)
ALT SERPL W/O P-5'-P-CCNC: 8 U/L (ref 11–66)
ANION GAP SERPL CALC-SCNC: 14 MEQ/L (ref 8–16)
AST SERPL-CCNC: 20 U/L (ref 5–40)
BASOPHILS ABSOLUTE: 0 THOU/MM3 (ref 0–0.1)
BASOPHILS NFR BLD AUTO: 0.4 %
BILIRUB SERPL-MCNC: 0.4 MG/DL (ref 0.3–1.2)
BUN SERPL-MCNC: 53 MG/DL (ref 7–22)
CALCIUM SERPL-MCNC: 9.8 MG/DL (ref 8.5–10.5)
CHLORIDE SERPL-SCNC: 103 MEQ/L (ref 98–111)
CHOLEST SERPL-MCNC: 125 MG/DL (ref 100–199)
CO2 SERPL-SCNC: 25 MEQ/L (ref 23–33)
CREAT SERPL-MCNC: 2.7 MG/DL (ref 0.4–1.2)
DEPRECATED RDW RBC AUTO: 58.5 FL (ref 35–45)
EOSINOPHIL NFR BLD AUTO: 3.6 %
EOSINOPHILS ABSOLUTE: 0.2 THOU/MM3 (ref 0–0.4)
ERYTHROCYTE [DISTWIDTH] IN BLOOD BY AUTOMATED COUNT: 16.2 % (ref 11.5–14.5)
FERRITIN SERPL IA-MCNC: 210 NG/ML (ref 22–322)
GFR SERPL CREATININE-BSD FRML MDRD: 22 ML/MIN/1.73M2
GLUCOSE SERPL-MCNC: 119 MG/DL (ref 70–108)
HCT VFR BLD AUTO: 32.4 % (ref 42–52)
HDLC SERPL-MCNC: 41 MG/DL
HGB BLD-MCNC: 10.1 GM/DL (ref 14–18)
IMM GRANULOCYTES # BLD AUTO: 0.03 THOU/MM3 (ref 0–0.07)
IMM GRANULOCYTES NFR BLD AUTO: 0.4 %
LDLC SERPL CALC-MCNC: 65 MG/DL
LYMPHOCYTES ABSOLUTE: 1.7 THOU/MM3 (ref 1–4.8)
LYMPHOCYTES NFR BLD AUTO: 24.6 %
MAGNESIUM SERPL-MCNC: 2.2 MG/DL (ref 1.6–2.4)
MCH RBC QN AUTO: 30.4 PG (ref 26–33)
MCHC RBC AUTO-ENTMCNC: 31.2 GM/DL (ref 32.2–35.5)
MCV RBC AUTO: 97.6 FL (ref 80–94)
MONOCYTES ABSOLUTE: 0.6 THOU/MM3 (ref 0.4–1.3)
MONOCYTES NFR BLD AUTO: 8.1 %
NEUTROPHILS ABSOLUTE: 4.3 THOU/MM3 (ref 1.8–7.7)
NEUTROPHILS NFR BLD AUTO: 62.9 %
NRBC BLD AUTO-RTO: 0 /100 WBC
PLATELET # BLD AUTO: 238 THOU/MM3 (ref 130–400)
PMV BLD AUTO: 11.1 FL (ref 9.4–12.4)
POTASSIUM SERPL-SCNC: 4.4 MEQ/L (ref 3.5–5.2)
PROT SERPL-MCNC: 6.8 G/DL (ref 6.1–8)
PSA SERPL-MCNC: 0.66 NG/ML (ref 0–1)
RBC # BLD AUTO: 3.32 MILL/MM3 (ref 4.7–6.1)
SODIUM SERPL-SCNC: 142 MEQ/L (ref 135–145)
T4 FREE SERPL-MCNC: 0.59 NG/DL (ref 0.93–1.68)
TRIGL SERPL-MCNC: 94 MG/DL (ref 0–199)
TSH SERPL DL<=0.005 MIU/L-ACNC: 70.43 UIU/ML (ref 0.4–4.2)
VIT B12 SERPL-MCNC: 471 PG/ML (ref 211–911)
WBC # BLD AUTO: 6.8 THOU/MM3 (ref 4.8–10.8)

## 2024-12-30 ENCOUNTER — LAB (OUTPATIENT)
Age: 89
End: 2024-12-30

## 2024-12-30 LAB
ANION GAP SERPL CALC-SCNC: 13 MEQ/L (ref 8–16)
BUN SERPL-MCNC: 44 MG/DL (ref 7–22)
CALCIUM SERPL-MCNC: 9.4 MG/DL (ref 8.5–10.5)
CHLORIDE SERPL-SCNC: 103 MEQ/L (ref 98–111)
CO2 SERPL-SCNC: 26 MEQ/L (ref 23–33)
CREAT SERPL-MCNC: 2.1 MG/DL (ref 0.4–1.2)
GFR SERPL CREATININE-BSD FRML MDRD: 30 ML/MIN/1.73M2
GLUCOSE SERPL-MCNC: 81 MG/DL (ref 70–108)
POTASSIUM SERPL-SCNC: 4.1 MEQ/L (ref 3.5–5.2)
SODIUM SERPL-SCNC: 142 MEQ/L (ref 135–145)
T4 FREE SERPL-MCNC: 0.59 NG/DL (ref 0.93–1.68)
TSH SERPL DL<=0.005 MIU/L-ACNC: 62.85 UIU/ML (ref 0.4–4.2)

## 2025-03-28 ENCOUNTER — LAB (OUTPATIENT)
Age: 89
End: 2025-03-28

## 2025-03-28 LAB
ALBUMIN SERPL BCG-MCNC: 3.7 G/DL (ref 3.4–4.9)
ALP SERPL-CCNC: 50 U/L (ref 40–129)
ALT SERPL W/O P-5'-P-CCNC: 9 U/L (ref 10–50)
ANION GAP SERPL CALC-SCNC: 12 MEQ/L (ref 8–16)
AST SERPL-CCNC: 24 U/L (ref 10–50)
BILIRUB SERPL-MCNC: 0.4 MG/DL (ref 0.3–1.2)
BUN SERPL-MCNC: 60 MG/DL (ref 8–23)
CALCIUM SERPL-MCNC: 9.4 MG/DL (ref 8.8–10.2)
CHLORIDE SERPL-SCNC: 105 MEQ/L (ref 98–111)
CO2 SERPL-SCNC: 26 MEQ/L (ref 22–29)
CREAT SERPL-MCNC: 2.6 MG/DL (ref 0.7–1.2)
FERRITIN SERPL IA-MCNC: 503 NG/ML (ref 30–400)
GFR SERPL CREATININE-BSD FRML MDRD: 23 ML/MIN/1.73M2
GLUCOSE SERPL-MCNC: 95 MG/DL (ref 74–109)
POTASSIUM SERPL-SCNC: 4.5 MEQ/L (ref 3.5–5.2)
PROT SERPL-MCNC: 6.4 G/DL (ref 6.4–8.3)
SCAN OF BLOOD SMEAR: NORMAL
SODIUM SERPL-SCNC: 143 MEQ/L (ref 135–145)
T4 FREE SERPL-MCNC: 1.6 NG/DL (ref 0.92–1.68)
TSH SERPL DL<=0.05 MIU/L-ACNC: 13.6 UIU/ML (ref 0.27–4.2)

## 2025-03-29 LAB
ANISOCYTOSIS BLD QL SMEAR: PRESENT
AUTO DIFF PNL BLD: ABNORMAL
BASOPHILS ABSOLUTE: 0.1 THOU/MM3 (ref 0–0.1)
BASOPHILS NFR BLD AUTO: 0.9 %
BURR CELLS BLD QL SMEAR: ABNORMAL
DEPRECATED RDW RBC AUTO: 58 FL (ref 35–45)
EOSINOPHIL NFR BLD AUTO: 4.6 %
EOSINOPHILS ABSOLUTE: 0.3 THOU/MM3 (ref 0–0.4)
ERYTHROCYTE [DISTWIDTH] IN BLOOD BY AUTOMATED COUNT: 15.2 % (ref 11.5–14.5)
HCT VFR BLD AUTO: 32.9 % (ref 42–52)
HGB BLD-MCNC: 10 GM/DL (ref 14–18)
IMM GRANULOCYTES # BLD AUTO: 0.02 THOU/MM3 (ref 0–0.07)
IMM GRANULOCYTES NFR BLD AUTO: 0.4 %
LYMPHOCYTES ABSOLUTE: 1.3 THOU/MM3 (ref 1–4.8)
LYMPHOCYTES NFR BLD AUTO: 23.5 %
MCH RBC QN AUTO: 31.6 PG (ref 26–33)
MCHC RBC AUTO-ENTMCNC: 30.4 GM/DL (ref 32.2–35.5)
MCV RBC AUTO: 104.1 FL (ref 80–94)
MONOCYTES ABSOLUTE: 0.5 THOU/MM3 (ref 0.4–1.3)
MONOCYTES NFR BLD AUTO: 8.7 %
NEUTROPHILS ABSOLUTE: 3.5 THOU/MM3 (ref 1.8–7.7)
NEUTROPHILS NFR BLD AUTO: 61.9 %
NRBC BLD AUTO-RTO: 0 /100 WBC
PATHOLOGIST REVIEW: ABNORMAL
PLATELET # BLD AUTO: 126 THOU/MM3 (ref 130–400)
PLATELET BLD QL SMEAR: ABNORMAL
PMV BLD AUTO: 12 FL (ref 9.4–12.4)
POIKILOCYTES: ABNORMAL
RBC # BLD AUTO: 3.16 MILL/MM3 (ref 4.7–6.1)
SCHISTOCYTES BLD QL SMEAR: ABNORMAL
WBC # BLD AUTO: 5.6 THOU/MM3 (ref 4.8–10.8)

## 2025-05-27 ENCOUNTER — OFFICE VISIT (OUTPATIENT)
Dept: UROLOGY | Age: 89
End: 2025-05-27
Payer: MEDICARE

## 2025-05-27 VITALS — RESPIRATION RATE: 20 BRPM | WEIGHT: 144 LBS | BODY MASS INDEX: 22.6 KG/M2 | HEIGHT: 67 IN

## 2025-05-27 DIAGNOSIS — R35.0 BENIGN PROSTATIC HYPERPLASIA WITH URINARY FREQUENCY: Primary | ICD-10-CM

## 2025-05-27 DIAGNOSIS — N32.81 OAB (OVERACTIVE BLADDER): ICD-10-CM

## 2025-05-27 DIAGNOSIS — N40.1 BENIGN PROSTATIC HYPERPLASIA WITH URINARY FREQUENCY: Primary | ICD-10-CM

## 2025-05-27 DIAGNOSIS — R33.9 INCOMPLETE BLADDER EMPTYING: ICD-10-CM

## 2025-05-27 LAB — POST VOID RESIDUAL (PVR): 117 ML

## 2025-05-27 PROCEDURE — G8427 DOCREV CUR MEDS BY ELIG CLIN: HCPCS

## 2025-05-27 PROCEDURE — 1036F TOBACCO NON-USER: CPT

## 2025-05-27 PROCEDURE — G8420 CALC BMI NORM PARAMETERS: HCPCS

## 2025-05-27 PROCEDURE — 99213 OFFICE O/P EST LOW 20 MIN: CPT

## 2025-05-27 PROCEDURE — 1159F MED LIST DOCD IN RCRD: CPT

## 2025-05-27 PROCEDURE — 51798 US URINE CAPACITY MEASURE: CPT

## 2025-05-27 PROCEDURE — 1123F ACP DISCUSS/DSCN MKR DOCD: CPT

## 2025-05-27 NOTE — PROGRESS NOTES
[facility] [place] [facility]  [facility]OLOG[facility]  [address]  [address]  [facility] [address]  Dept: [phone]  Loc: [phone]  Visit Date: 5/27/2025      HPI  Samuel Gupta is a 89 y.o. male that presents to the urology clinic for BPH and UUI follow-up.    BPH   S/P Greenlight PVP by Dr. Hester in February 2024 followed by Placement of InterStim Device (Stage I/II) by Dr. Hester on 07/05/24. Still with weak urinary stream and post-void dribbling. Not overly bothersome. IPSS of 11/5.    Doing well at this time. PVR remains adequate at 117 mL.    OAB  Nocturia x 2, down from reported 3 x at last visit.     Patient denies UUI following placement of InterStim Device.      Results for orders placed or performed in visit on 05/27/25   poct post void residual   Result Value Ref Range    post void residual 117 ml         Last BUN and Creatinine:  Lab Results   Component Value Date    BUN 60 (H) 03/28/2025     Lab Results   Component Value Date    CREATININE 2.6 (H) 03/28/2025         PAST MEDICAL, FAMILY AND SOCIAL HISTORY UPDATE:  Past Medical History:   Diagnosis Date    Acute hemorrhagic cystitis     JACKI (acute kidney injury)     Aortic stenosis     Cancer (HCC)     colon    Chronic diastolic heart failure (HCC)     CKD (chronic kidney disease)     stage 4    Esophageal reflux     High cholesterol     History of gross hematuria     History of tobacco use     History of urinary retention     Hypertension     Hypertrophy of prostate without urinary obstruction and other lower urinary tract symptoms (LUTS)     Hypothyroid     Incomplete bladder emptying     Nocturia     4-5x a night    PAF (paroxysmal atrial fibrillation) (HCC)     PVD (peripheral vascular disease)     Reflux     Urinary tract infection, site not specified      Past Surgical History:   Procedure Laterality Date    AORTIC VALVE REPLACEMENT  2020    CARDIAC CATHETERIZATION      CATARACT REMOVAL Bilateral     COLECTOMY

## 2025-05-28 ENCOUNTER — LAB (OUTPATIENT)
Age: 89
End: 2025-05-28

## 2025-05-28 DIAGNOSIS — N18.4 CKD (CHRONIC KIDNEY DISEASE), STAGE IV (HCC): ICD-10-CM

## 2025-05-28 DIAGNOSIS — I12.9 HYPERTENSIVE RENAL DISEASE, STAGE 1 THROUGH STAGE 4 OR UNSPECIFIED CHRONIC KIDNEY DISEASE: ICD-10-CM

## 2025-05-28 DIAGNOSIS — N28.1 RENAL CYST: ICD-10-CM

## 2025-05-28 LAB — PHOSPHATE SERPL-MCNC: 3.1 MG/DL (ref 2.5–4.5)

## 2025-05-29 DIAGNOSIS — N18.4 CKD (CHRONIC KIDNEY DISEASE), STAGE IV (HCC): ICD-10-CM

## 2025-05-29 DIAGNOSIS — N28.1 RENAL CYST: ICD-10-CM

## 2025-05-29 DIAGNOSIS — I12.9 HYPERTENSIVE RENAL DISEASE, STAGE 1 THROUGH STAGE 4 OR UNSPECIFIED CHRONIC KIDNEY DISEASE: ICD-10-CM

## 2025-05-29 LAB
ANION GAP SERPL CALC-SCNC: 16 MEQ/L (ref 8–16)
BUN SERPL-MCNC: 57 MG/DL (ref 8–23)
CALCIUM SERPL-MCNC: 9.5 MG/DL (ref 8.8–10.2)
CHLORIDE SERPL-SCNC: 105 MEQ/L (ref 98–111)
CO2 SERPL-SCNC: 21 MEQ/L (ref 22–29)
CREAT SERPL-MCNC: 1.9 MG/DL (ref 0.7–1.2)
GFR SERPL CREATININE-BSD FRML MDRD: 33 ML/MIN/1.73M2
GLUCOSE SERPL-MCNC: 82 MG/DL (ref 74–109)
POTASSIUM SERPL-SCNC: 4.4 MEQ/L (ref 3.5–5.2)
SODIUM SERPL-SCNC: 142 MEQ/L (ref 135–145)

## 2025-06-04 ENCOUNTER — LAB (OUTPATIENT)
Age: 89
End: 2025-06-04

## 2025-06-04 DIAGNOSIS — N28.1 RENAL CYST: ICD-10-CM

## 2025-06-04 DIAGNOSIS — I12.9 HYPERTENSIVE RENAL DISEASE, STAGE 1 THROUGH STAGE 4 OR UNSPECIFIED CHRONIC KIDNEY DISEASE: ICD-10-CM

## 2025-06-04 DIAGNOSIS — N18.4 CKD (CHRONIC KIDNEY DISEASE), STAGE IV (HCC): ICD-10-CM

## 2025-06-04 LAB
ANION GAP SERPL CALC-SCNC: 13 MEQ/L (ref 8–16)
BUN SERPL-MCNC: 58 MG/DL (ref 8–23)
CALCIUM SERPL-MCNC: 9.4 MG/DL (ref 8.8–10.2)
CHLORIDE SERPL-SCNC: 106 MEQ/L (ref 98–111)
CO2 SERPL-SCNC: 25 MEQ/L (ref 22–29)
CREAT SERPL-MCNC: 2 MG/DL (ref 0.7–1.2)
GFR SERPL CREATININE-BSD FRML MDRD: 31 ML/MIN/1.73M2
GLUCOSE SERPL-MCNC: 92 MG/DL (ref 74–109)
HCT VFR BLD AUTO: 32.3 % (ref 42–52)
HGB BLD-MCNC: 10.1 GM/DL (ref 14–18)
PHOSPHATE SERPL-MCNC: 3.1 MG/DL (ref 2.5–4.5)
POTASSIUM SERPL-SCNC: 4.2 MEQ/L (ref 3.5–5.2)
PTH-INTACT SERPL-MCNC: 53 PG/ML (ref 15–65)
SODIUM SERPL-SCNC: 144 MEQ/L (ref 135–145)
T4 FREE SERPL-MCNC: 0.7 NG/DL (ref 0.92–1.68)
TSH SERPL DL<=0.05 MIU/L-ACNC: 60.1 UIU/ML (ref 0.27–4.2)

## 2025-06-23 ENCOUNTER — OFFICE VISIT (OUTPATIENT)
Dept: NEPHROLOGY | Age: 89
End: 2025-06-23
Payer: MEDICARE

## 2025-06-23 VITALS
SYSTOLIC BLOOD PRESSURE: 120 MMHG | BODY MASS INDEX: 21.97 KG/M2 | WEIGHT: 140 LBS | HEART RATE: 69 BPM | DIASTOLIC BLOOD PRESSURE: 60 MMHG | HEIGHT: 67 IN | OXYGEN SATURATION: 97 %

## 2025-06-23 DIAGNOSIS — D63.1 ANEMIA IN STAGE 4 CHRONIC KIDNEY DISEASE (HCC): ICD-10-CM

## 2025-06-23 DIAGNOSIS — N18.4 ANEMIA IN STAGE 4 CHRONIC KIDNEY DISEASE (HCC): ICD-10-CM

## 2025-06-23 DIAGNOSIS — I12.9 HYPERTENSIVE RENAL DISEASE, STAGE 1 THROUGH STAGE 4 OR UNSPECIFIED CHRONIC KIDNEY DISEASE: ICD-10-CM

## 2025-06-23 DIAGNOSIS — N18.4 CKD (CHRONIC KIDNEY DISEASE), STAGE IV (HCC): Primary | ICD-10-CM

## 2025-06-23 PROCEDURE — 1159F MED LIST DOCD IN RCRD: CPT | Performed by: INTERNAL MEDICINE

## 2025-06-23 PROCEDURE — G8427 DOCREV CUR MEDS BY ELIG CLIN: HCPCS | Performed by: INTERNAL MEDICINE

## 2025-06-23 PROCEDURE — 1036F TOBACCO NON-USER: CPT | Performed by: INTERNAL MEDICINE

## 2025-06-23 PROCEDURE — 1123F ACP DISCUSS/DSCN MKR DOCD: CPT | Performed by: INTERNAL MEDICINE

## 2025-06-23 PROCEDURE — 99213 OFFICE O/P EST LOW 20 MIN: CPT | Performed by: INTERNAL MEDICINE

## 2025-06-23 PROCEDURE — G8420 CALC BMI NORM PARAMETERS: HCPCS | Performed by: INTERNAL MEDICINE

## 2025-06-23 NOTE — PROGRESS NOTES
predniSONE (DELTASONE) 10 MG tablet       furosemide (LASIX) 40 MG tablet Take 1 tablet by mouth daily      levothyroxine (SYNTHROID) 100 MCG tablet Take 88 mcg by mouth Daily      isosorbide mononitrate (IMDUR) 60 MG extended release tablet Take 1 tablet by mouth daily      cloNIDine (CATAPRES) 0.3 MG/24HR PTWK Place 1 patch onto the skin once a week Sunday      Multiple Vitamins-Minerals (PRESERVISION AREDS PO) Take by mouth daily      montelukast (SINGULAIR) 10 MG tablet Take 1 tablet by mouth daily      escitalopram (LEXAPRO) 10 MG tablet Take 1 tablet by mouth daily      mirtazapine (REMERON) 7.5 MG tablet Take 1 tablet by mouth nightly at bedtime.      albuterol sulfate HFA (PROVENTIL;VENTOLIN;PROAIR) 108 (90 Base) MCG/ACT inhaler       aspirin 81 MG EC tablet Take by mouth daily      atorvastatin (LIPITOR) 40 MG tablet TAKE 1 TABLET BY MOUTH EVERY DAY AT BEDTIME      esomeprazole (NEXIUM) 40 MG delayed release capsule Take by mouth daily      ferrous sulfate (IRON 325) 325 (65 Fe) MG tablet TAKE 1 TABLET BY MOUTH EVERY DAY      hydrALAZINE (APRESOLINE) 100 MG tablet TAKE 1 TABLET BY MOUTH 4 TIMES DAILY      polyethyl glycol-propyl glycol 0.4-0.3 % (SYSTANE) 0.4-0.3 % ophthalmic solution Place into both eyes as needed for Dry Eyes ceived the following from [facility]: Outside name: peg 400-propylene glycol (Systane, propylene glycoL,) 0.4-0.3 % drop       No current facility-administered medications for this visit.        Laboratory & Diagnostics:  Old progress notes from referring physician reviewed.  Radiology/US kidneys: B/L renal simple cyst, Non-obs kidney stone, 10.9 and 9.7 cm kidneys.     Sept 2021: creat 2.4  April 2023: Creat 2.6  June 2023: creat 1.9  July 2023: K 4.3, Creat 2.0, Mg 2.1 LDL 52, Albumin 3.3, Hb 9.1    Sept 2023: UPCR 16/102, UA: no blood, no protein\  K 3.5, Creat 2.8, Hb 9.7, PTH 60, phos 3.9  Oct 2023: K 4.4, Creat 2.4, Hb 9.9    June 2024: K 4.2, creat 2.7. Mg

## (undated) DEVICE — SUTURE MONOCRYL + SZ 3-0 L27IN ABSRB UD PS1 L24MM 3/8 CIR REV MCP936H

## (undated) DEVICE — DRAINBAG,ANTI-REFLUX TOWER,L/F,2000ML,LL: Brand: MEDLINE

## (undated) DEVICE — C-ARMOR C-ARM EQUIPMENT COVERS CLEAR STERILE UNIVERSAL FIT 12 PER CASE: Brand: C-ARMOR

## (undated) DEVICE — PREMIUM DRY TRAY LF: Brand: MEDLINE INDUSTRIES, INC.

## (undated) DEVICE — MASTISOL ADHESIVE LIQ 2/3ML

## (undated) DEVICE — DRESSING TRNSPAR W5XL4.5IN FLM SHT SEMIPERMEABLE WIND

## (undated) DEVICE — SUTURE PROL SZ 2-0 L30IN NONABSORBABLE BLU L26MM CT-1 1/2 8423H

## (undated) DEVICE — SOLUTION IRRIG 3000ML 0.9% SOD CHL USP UROMATIC PLAS CONT

## (undated) DEVICE — GLOVE ORANGE PI 7 1/2   MSG9075

## (undated) DEVICE — GAUZE,SPONGE,4"X4",12PLY,STERILE,LF,2'S: Brand: MEDLINE

## (undated) DEVICE — LASER SURG W22XH58IN D36IN 475LB SLD STATE FREQ DOUBLED

## (undated) DEVICE — SOLUTION PREP PAINT POV IOD FOR SKIN MUCOUS MEM

## (undated) DEVICE — NEUROSTIMULATOR EXT SM LTWT SGL BTTN H2O RESIST WIRELESS

## (undated) DEVICE — STIMULATOR: Brand: MEDLINE INDUSTRIES, INC.

## (undated) DEVICE — SOLUTION IRRIG 1000ML STRL H2O USP PLAS POUR BTL

## (undated) DEVICE — SUTURE VICRYL + SZ 3-0 L27IN ABSRB UD L26MM SH 1/2 CIR VCP416H

## (undated) DEVICE — BAG,BANDED,W/RUBBERBAND,STERILE,30X36: Brand: MEDLINE

## (undated) DEVICE — CYSTO: Brand: MEDLINE INDUSTRIES, INC.

## (undated) DEVICE — SYRINGE MED 10ML LUERLOCK TIP W/O SFTY DISP

## (undated) DEVICE — CATHETER URETH 24FR BLLN 30CC STD LTX 3 W TWO OPP DRNGE EYE

## (undated) DEVICE — PROGRAMMER SMART COMM W/HANDSET F/SACRAL NRVE STIMULATORS

## (undated) DEVICE — SOLUTION SCRB 4OZ 7.5% POVIDONE IOD ANTIMIC BTL

## (undated) DEVICE — SET IRRIG L94IN ID0.281IN W/ 4.5IN DST FLX CONN 2 LD ON OFF